# Patient Record
Sex: FEMALE | URBAN - METROPOLITAN AREA
[De-identification: names, ages, dates, MRNs, and addresses within clinical notes are randomized per-mention and may not be internally consistent; named-entity substitution may affect disease eponyms.]

---

## 2023-01-12 ENCOUNTER — INPATIENT (INPATIENT)
Facility: HOSPITAL | Age: 58
LOS: 4 days | Discharge: HOME | DRG: 392 | End: 2023-01-17
Attending: FAMILY MEDICINE | Admitting: FAMILY MEDICINE
Payer: MEDICAID

## 2023-01-12 VITALS
SYSTOLIC BLOOD PRESSURE: 103 MMHG | WEIGHT: 113.1 LBS | HEART RATE: 76 BPM | OXYGEN SATURATION: 100 % | RESPIRATION RATE: 15 BRPM | HEIGHT: 63 IN | DIASTOLIC BLOOD PRESSURE: 69 MMHG | TEMPERATURE: 98 F

## 2023-01-12 DIAGNOSIS — Z90.49 ACQUIRED ABSENCE OF OTHER SPECIFIED PARTS OF DIGESTIVE TRACT: Chronic | ICD-10-CM

## 2023-01-12 DIAGNOSIS — K57.32 DIVERTICULITIS OF LARGE INTESTINE WITHOUT PERFORATION OR ABSCESS WITHOUT BLEEDING: ICD-10-CM

## 2023-01-12 LAB
ALBUMIN SERPL ELPH-MCNC: 3.8 G/DL — SIGNIFICANT CHANGE UP (ref 3.3–5)
ALP SERPL-CCNC: 63 U/L — SIGNIFICANT CHANGE UP (ref 40–120)
ALT FLD-CCNC: 21 U/L — SIGNIFICANT CHANGE UP (ref 12–78)
ANION GAP SERPL CALC-SCNC: 5 MMOL/L — SIGNIFICANT CHANGE UP (ref 5–17)
APPEARANCE UR: CLEAR — SIGNIFICANT CHANGE UP
AST SERPL-CCNC: 20 U/L — SIGNIFICANT CHANGE UP (ref 15–37)
BASOPHILS # BLD AUTO: 0.05 K/UL — SIGNIFICANT CHANGE UP (ref 0–0.2)
BASOPHILS NFR BLD AUTO: 0.3 % — SIGNIFICANT CHANGE UP (ref 0–2)
BILIRUB SERPL-MCNC: 1.4 MG/DL — HIGH (ref 0.2–1.2)
BILIRUB UR-MCNC: NEGATIVE — SIGNIFICANT CHANGE UP
BUN SERPL-MCNC: 10 MG/DL — SIGNIFICANT CHANGE UP (ref 7–23)
CALCIUM SERPL-MCNC: 9 MG/DL — SIGNIFICANT CHANGE UP (ref 8.5–10.1)
CHLORIDE SERPL-SCNC: 105 MMOL/L — SIGNIFICANT CHANGE UP (ref 96–108)
CO2 SERPL-SCNC: 33 MMOL/L — HIGH (ref 22–31)
COLOR SPEC: SIGNIFICANT CHANGE UP
CREAT SERPL-MCNC: 0.71 MG/DL — SIGNIFICANT CHANGE UP (ref 0.5–1.3)
DIFF PNL FLD: ABNORMAL
EGFR: 100 ML/MIN/1.73M2 — SIGNIFICANT CHANGE UP
EOSINOPHIL # BLD AUTO: 0.02 K/UL — SIGNIFICANT CHANGE UP (ref 0–0.5)
EOSINOPHIL NFR BLD AUTO: 0.1 % — SIGNIFICANT CHANGE UP (ref 0–6)
GLUCOSE SERPL-MCNC: 133 MG/DL — HIGH (ref 70–99)
GLUCOSE UR QL: NEGATIVE — SIGNIFICANT CHANGE UP
HCG SERPL-ACNC: 3 MIU/ML — SIGNIFICANT CHANGE UP
HCT VFR BLD CALC: 39.9 % — SIGNIFICANT CHANGE UP (ref 34.5–45)
HGB BLD-MCNC: 12.6 G/DL — SIGNIFICANT CHANGE UP (ref 11.5–15.5)
IMM GRANULOCYTES NFR BLD AUTO: 0.4 % — SIGNIFICANT CHANGE UP (ref 0–0.9)
KETONES UR-MCNC: NEGATIVE — SIGNIFICANT CHANGE UP
LEUKOCYTE ESTERASE UR-ACNC: NEGATIVE — SIGNIFICANT CHANGE UP
LYMPHOCYTES # BLD AUTO: 1.54 K/UL — SIGNIFICANT CHANGE UP (ref 1–3.3)
LYMPHOCYTES # BLD AUTO: 9.9 % — LOW (ref 13–44)
MCHC RBC-ENTMCNC: 28.4 PG — SIGNIFICANT CHANGE UP (ref 27–34)
MCHC RBC-ENTMCNC: 31.6 GM/DL — LOW (ref 32–36)
MCV RBC AUTO: 89.9 FL — SIGNIFICANT CHANGE UP (ref 80–100)
MONOCYTES # BLD AUTO: 1.05 K/UL — HIGH (ref 0–0.9)
MONOCYTES NFR BLD AUTO: 6.8 % — SIGNIFICANT CHANGE UP (ref 2–14)
NEUTROPHILS # BLD AUTO: 12.82 K/UL — HIGH (ref 1.8–7.4)
NEUTROPHILS NFR BLD AUTO: 82.5 % — HIGH (ref 43–77)
NITRITE UR-MCNC: NEGATIVE — SIGNIFICANT CHANGE UP
NRBC # BLD: 0 /100 WBCS — SIGNIFICANT CHANGE UP (ref 0–0)
PH UR: 7 — SIGNIFICANT CHANGE UP (ref 5–8)
PLATELET # BLD AUTO: 131 K/UL — LOW (ref 150–400)
POTASSIUM SERPL-MCNC: 4.4 MMOL/L — SIGNIFICANT CHANGE UP (ref 3.5–5.3)
POTASSIUM SERPL-SCNC: 4.4 MMOL/L — SIGNIFICANT CHANGE UP (ref 3.5–5.3)
PROT SERPL-MCNC: 7.3 G/DL — SIGNIFICANT CHANGE UP (ref 6–8.3)
PROT UR-MCNC: NEGATIVE — SIGNIFICANT CHANGE UP
RBC # BLD: 4.44 M/UL — SIGNIFICANT CHANGE UP (ref 3.8–5.2)
RBC # FLD: 12.4 % — SIGNIFICANT CHANGE UP (ref 10.3–14.5)
SARS-COV-2 RNA SPEC QL NAA+PROBE: SIGNIFICANT CHANGE UP
SODIUM SERPL-SCNC: 143 MMOL/L — SIGNIFICANT CHANGE UP (ref 135–145)
SP GR SPEC: 1.01 — SIGNIFICANT CHANGE UP (ref 1.01–1.02)
UROBILINOGEN FLD QL: NEGATIVE — SIGNIFICANT CHANGE UP
WBC # BLD: 15.54 K/UL — HIGH (ref 3.8–10.5)
WBC # FLD AUTO: 15.54 K/UL — HIGH (ref 3.8–10.5)

## 2023-01-12 PROCEDURE — 71045 X-RAY EXAM CHEST 1 VIEW: CPT | Mod: 26

## 2023-01-12 PROCEDURE — 74177 CT ABD & PELVIS W/CONTRAST: CPT | Mod: 26,MA

## 2023-01-12 PROCEDURE — 99285 EMERGENCY DEPT VISIT HI MDM: CPT

## 2023-01-12 RX ORDER — ATORVASTATIN CALCIUM 80 MG/1
20 TABLET, FILM COATED ORAL AT BEDTIME
Refills: 0 | Status: DISCONTINUED | OUTPATIENT
Start: 2023-01-12 | End: 2023-01-17

## 2023-01-12 RX ORDER — ACETAMINOPHEN 500 MG
650 TABLET ORAL EVERY 6 HOURS
Refills: 0 | Status: DISCONTINUED | OUTPATIENT
Start: 2023-01-12 | End: 2023-01-13

## 2023-01-12 RX ORDER — HEPARIN SODIUM 5000 [USP'U]/ML
5000 INJECTION INTRAVENOUS; SUBCUTANEOUS EVERY 12 HOURS
Refills: 0 | Status: DISCONTINUED | OUTPATIENT
Start: 2023-01-12 | End: 2023-01-13

## 2023-01-12 RX ORDER — SODIUM CHLORIDE 9 MG/ML
1000 INJECTION INTRAMUSCULAR; INTRAVENOUS; SUBCUTANEOUS ONCE
Refills: 0 | Status: COMPLETED | OUTPATIENT
Start: 2023-01-12 | End: 2023-01-12

## 2023-01-12 RX ORDER — SODIUM CHLORIDE 9 MG/ML
1000 INJECTION, SOLUTION INTRAVENOUS
Refills: 0 | Status: DISCONTINUED | OUTPATIENT
Start: 2023-01-12 | End: 2023-01-13

## 2023-01-12 RX ORDER — PIPERACILLIN AND TAZOBACTAM 4; .5 G/20ML; G/20ML
3.38 INJECTION, POWDER, LYOPHILIZED, FOR SOLUTION INTRAVENOUS EVERY 8 HOURS
Refills: 0 | Status: DISCONTINUED | OUTPATIENT
Start: 2023-01-12 | End: 2023-01-17

## 2023-01-12 RX ORDER — LACTOBACILLUS ACIDOPHILUS 100MM CELL
1 CAPSULE ORAL DAILY
Refills: 0 | Status: DISCONTINUED | OUTPATIENT
Start: 2023-01-12 | End: 2023-01-17

## 2023-01-12 RX ORDER — PIPERACILLIN AND TAZOBACTAM 4; .5 G/20ML; G/20ML
3.38 INJECTION, POWDER, LYOPHILIZED, FOR SOLUTION INTRAVENOUS ONCE
Refills: 0 | Status: COMPLETED | OUTPATIENT
Start: 2023-01-12 | End: 2023-01-12

## 2023-01-12 RX ADMIN — PIPERACILLIN AND TAZOBACTAM 3.38 GRAM(S): 4; .5 INJECTION, POWDER, LYOPHILIZED, FOR SOLUTION INTRAVENOUS at 15:25

## 2023-01-12 RX ADMIN — PIPERACILLIN AND TAZOBACTAM 200 GRAM(S): 4; .5 INJECTION, POWDER, LYOPHILIZED, FOR SOLUTION INTRAVENOUS at 14:55

## 2023-01-12 RX ADMIN — ATORVASTATIN CALCIUM 20 MILLIGRAM(S): 80 TABLET, FILM COATED ORAL at 22:54

## 2023-01-12 RX ADMIN — SODIUM CHLORIDE 1000 MILLILITER(S): 9 INJECTION INTRAMUSCULAR; INTRAVENOUS; SUBCUTANEOUS at 11:30

## 2023-01-12 RX ADMIN — SODIUM CHLORIDE 1000 MILLILITER(S): 9 INJECTION INTRAMUSCULAR; INTRAVENOUS; SUBCUTANEOUS at 12:30

## 2023-01-12 RX ADMIN — SODIUM CHLORIDE 125 MILLILITER(S): 9 INJECTION, SOLUTION INTRAVENOUS at 18:48

## 2023-01-12 RX ADMIN — PIPERACILLIN AND TAZOBACTAM 25 GRAM(S): 4; .5 INJECTION, POWDER, LYOPHILIZED, FOR SOLUTION INTRAVENOUS at 22:54

## 2023-01-12 NOTE — ED ADULT NURSE NOTE - OBJECTIVE STATEMENT
Pt accompanied by daughter, translated by daughter with pt's consent, Pt with history of partial colon resection (sigmoid colon 7/13/2022) sent to ED by urgent care for abdominal pain and fever since Tuesday (x2 days).  Pain is right lower quadrant, constant, sharp, nonradiating.  Associated with nausea, no vomiting.  Patient also constipated however passing gas.  Denies chest pain, shortness of breath, urinary symptoms, flank pain, cough, congestion, headache, dizziness, syncope, upper or lower extremity weakness or paresthesias. family st bedside, safety maintained, call bell within reach. Nursing care ongoing.

## 2023-01-12 NOTE — CONSULT NOTE ADULT - SUBJECTIVE AND OBJECTIVE BOX
HPI:  Ms. Skinner is a 56 yo F with a hist    PAST MEDICAL & SURGICAL HISTORY:  Colon cancer      HLD (hyperlipidemia)      H/O partial resection of colon          REVIEW OF SYSTEMS      General:	    Skin/Breast:  	  Ophthalmologic:  	  ENMT:	    Respiratory and Thorax:  	  Cardiovascular:	    Gastrointestinal:	    Genitourinary:	    Musculoskeletal:	    Neurological:	    Psychiatric:	    Hematology/Lymphatics:	    Endocrine:	    Allergic/Immunologic:	    MEDICATIONS  (STANDING):    MEDICATIONS  (PRN):      Allergies    No Known Allergies    Intolerances        SOCIAL HISTORY:    FAMILY HISTORY:      Vital Signs Last 24 Hrs  T(C): 36.6 (2023 11:06), Max: 36.6 (2023 11:06)  T(F): 97.8 (2023 11:06), Max: 97.8 (2023 11:06)  HR: 76 (2023 11:06) (76 - 76)  BP: 103/69 (2023 11:06) (103/69 - 103/69)  BP(mean): --  RR: 15 (2023 11:06) (15 - 15)  SpO2: 100% (2023 11:06) (100% - 100%)    Parameters below as of 2023 11:06  Patient On (Oxygen Delivery Method): room air        PHYSICAL EXAM:      Constitutional:    Eyes:    ENMT:    Neck:    Breasts:    Back:    Respiratory:    Cardiovascular:    Gastrointestinal:    Genitourinary:    Rectal:    Extremities:    Vascular:    Neurological:    Skin:    Lymph Nodes:    Musculoskeletal:    Psychiatric:        LABS:                        12.6   15.54 )-----------( 131      ( 2023 11:30 )             39.9     12    143  |  105  |  10  ----------------------------<  133<H>  4.4   |  33<H>  |  0.71    Ca    9.0      2023 11:30    TPro  7.3  /  Alb  3.8  /  TBili  1.4<H>  /  DBili  x   /  AST  20  /  ALT  21  /  AlkPhos  63  01-12      Urinalysis Basic - ( 2023 13:20 )    Color: Pale Yellow / Appearance: Clear / S.010 / pH: x  Gluc: x / Ketone: Negative  / Bili: Negative / Urobili: Negative   Blood: x / Protein: Negative / Nitrite: Negative   Leuk Esterase: Negative / RBC: 6-10 /HPF / WBC 0-2   Sq Epi: x / Non Sq Epi: Occasional / Bacteria: Occasional        RADIOLOGY & ADDITIONAL STUDIES: HPI:  Ms. Skinner is a 56 yo F with a history of colon cancer found last 2022 on colonoscopy, for which she had a sigmoid resection done in 2022. She presents with a two day history of RLQ abdominal pain with associated constipation, passing flatus, but denies any nausea or vomiting. She reports a Tmax of 101 at home. She describes the pain as a sharp, non-radiating, constant pain. She denies any chest pian, SOB, dysuria, weakness or fatigue. On exam she is tender to palpation in the right lower quadrant and periumbilically but still soft and non-distended. History obtained using daughter as  over the phone.     Labs significant for an elevated WBC at 15.5, CT findings include diverticulitis r/o abscess.     PAST MEDICAL & SURGICAL HISTORY:  Colon cancer    HLD (hyperlipidemia)    H/O partial resection of colon        REVIEW OF SYSTEMS  Head: denies headaches, dizziness & lightheadedness  Eyes: denies changes in vision, eye pain, double vision & eye discharge  Ears: denies changes in hearing & ear discharge  Nose: denies rhinorrhea  Mouth: denies bleeding gums & sore tongue & sore throat  Neck: denies swollen lymph nodes   Respiratory: denies SOB, cough, sputum production, wheezing  Cardiac: denies CP & irregular heart beat  Abdominal: as noted in HPI  : denies dysuria, frequent urination, hematuria  Musculoskeletal: denies joint pain & muscle pain  Neuro: denies involuntary muscle movements  Psych: no depression, no anxiety     MEDICATIONS  (STANDING):    MEDICATIONS  (PRN):      Allergies    No Known Allergies      Vital Signs Last 24 Hrs  T(C): 36.6 (2023 11:06), Max: 36.6 (2023 11:06)  T(F): 97.8 (2023 11:06), Max: 97.8 (2023 11:06)  HR: 76 (2023 11:06) (76 - 76)  BP: 103/69 (2023 11:06) (103/69 - 103/69)  BP(mean): --  RR: 15 (2023 11:06) (15 - 15)  SpO2: 100% (2023 11:06) (100% - 100%)    Parameters below as of 2023 11:06  Patient On (Oxygen Delivery Method): room air        PHYSICAL EXAM:  Constitutional: AAOx3, no acute distress  HEENT: NCAT, airway patent  Cardiovascular: RRR, pulses present bilaterally  Respiratory: nonlabored breathing  Gastrointestinal: abdomen soft, tender to palpation in the RLQ and periumbilical, non distended, no rebound or guarding  Neuro: no focal deficits     LABS:                        12.6   15.54 )-----------( 131      ( 2023 11:30 )             39.9         143  |  105  |  10  ----------------------------<  133<H>  4.4   |  33<H>  |  0.71    Ca    9.0      2023 11:30    TPro  7.3  /  Alb  3.8  /  TBili  1.4<H>  /  DBili  x   /  AST  20  /  ALT  21  /  AlkPhos  63        Urinalysis Basic - ( 2023 13:20 )    Color: Pale Yellow / Appearance: Clear / S.010 / pH: x  Gluc: x / Ketone: Negative  / Bili: Negative / Urobili: Negative   Blood: x / Protein: Negative / Nitrite: Negative   Leuk Esterase: Negative / RBC: 6-10 /HPF / WBC 0-2   Sq Epi: x / Non Sq Epi: Occasional / Bacteria: Occasional        RADIOLOGY & ADDITIONAL STUDIES:  < from: CT Abdomen and Pelvis w/ IV Cont (23 @ 13:17) >  ACC: [id number] EXAM:  CT ABDOMEN AND PELVIS IC                          PROCEDURE DATE:  2023          INTERPRETATION:  CLINICAL INFORMATION: Right lower quadrant abdominal   pain status post partial colonic resection for colon carcinoma    COMPARISON: No prior examinations available for comparison.    CONTRAST/COMPLICATIONS:  IV Contrast: Omnipaque 350  90 cc administered   10 cc discarded  Oral Contrast: NONE  Complications: None reported at time of study completion    PROCEDURE:  CT of the Abdomen and Pelvis was performed.  Sagittal and coronal reformats were performed.    FINDINGS:  LOWER CHEST: No significant pleural or parenchymal abnormality.    LIVER: Within normal limits.  BILE DUCTS: Normal caliber.  GALLBLADDER: Within normal limits.  SPLEEN: Within normal limits.  PANCREAS: Within normal limits.  ADRENALS: Within normal limits.  KIDNEYS/URETERS: No hydronephrosis. No renal calculi. Nonspecific   subcentimeter hypodense regions are identified within the renal   parenchyma.    BLADDER: Minimally distended.  REPRODUCTIVE ORGANS: Uterus not visualized. Correlate with surgical   history.    BOWEL: Surgical anastomosis identified in the region of the distal   sigmoid colon. Fecal material scattered throughout the colon. There is   segmental wall thickening of the distal ascending colon with stranding of   the surrounding paracolic fat and thickening of the adjacent   retroperitoneal fascial plane. Findings most suggestive for segmental   colonic diverticulitis. Intramural abscess within the medial wall of the   distal ascending colon cannot be excluded. Appendix is normal.  PERITONEUM: A small volume of free pelvic fluid identified. No free   intraperitoneal air.  VESSELS: Within normal limits.  RETROPERITONEUM/LYMPH NODES: No lymphadenopathy.  ABDOMINAL WALL: Within normal limits.  BONES: Nonspecific region of sclerosis right ischial region. Additional   sclerotic foci are identified within the bilateral iliac bones. No acute   osseous fracture.    IMPRESSION:  1. There is segmental wall thickening of the distal ascending colon with   stranding of the surrounding paracolic fat and thickening of the adjacent   retroperitoneal fascial plane. Findings most suggestive for segmental   colonic diverticulitis. Intramural abscess within the medial wall of the   distal ascending colon cannot be excluded.  2. Nonspecific region of sclerosis right ischial region. Additional   sclerotic foci are identified within the bilateral iliac bones. Correlate   with prior examinations. Consider bone scintigraphy for further   assessment in light of known history of colon carcinoma.          --- End of Report ---            MARY CASTRO MD; Attending Radiologist  This document has been electronically signed. 2023  2:07PM    < end of copied text >   HPI:  Ms. Skinner is a 56 yo F with a history of colon cancer found last 2022 on colonoscopy, for which she had a sigmoid resection done in 2022 with Dr. Pedro Bautista, [facility]. She presents with a two day history of RLQ abdominal pain with associated constipation, passing flatus, but denies any nausea or vomiting. She reports a Tmax of 101 at home. She describes the pain as a sharp, non-radiating, constant pain. She denies any chest pian, SOB, dysuria, weakness or fatigue. On exam she is tender to palpation in the right lower quadrant and periumbilically but still soft and non-distended. History obtained using daughter as  over the phone.     Labs significant for an elevated WBC at 15.5, CT findings include diverticulitis r/o abscess.     PAST MEDICAL & SURGICAL HISTORY:  Colon cancer    HLD (hyperlipidemia)    H/O partial resection of colon        REVIEW OF SYSTEMS  Head: denies headaches, dizziness & lightheadedness  Eyes: denies changes in vision, eye pain, double vision & eye discharge  Ears: denies changes in hearing & ear discharge  Nose: denies rhinorrhea  Mouth: denies bleeding gums & sore tongue & sore throat  Neck: denies swollen lymph nodes   Respiratory: denies SOB, cough, sputum production, wheezing  Cardiac: denies CP & irregular heart beat  Abdominal: as noted in HPI  : denies dysuria, frequent urination, hematuria  Musculoskeletal: denies joint pain & muscle pain  Neuro: denies involuntary muscle movements  Psych: no depression, no anxiety     MEDICATIONS  (STANDING):    MEDICATIONS  (PRN):      Allergies    No Known Allergies      Vital Signs Last 24 Hrs  T(C): 36.6 (2023 11:06), Max: 36.6 (2023 11:06)  T(F): 97.8 (2023 11:06), Max: 97.8 (2023 11:06)  HR: 76 (2023 11:06) (76 - 76)  BP: 103/69 (2023 11:06) (103/69 - 103/69)  BP(mean): --  RR: 15 (2023 11:06) (15 - 15)  SpO2: 100% (2023 11:06) (100% - 100%)    Parameters below as of 2023 11:06  Patient On (Oxygen Delivery Method): room air        PHYSICAL EXAM:  Constitutional: AAOx3, no acute distress  HEENT: NCAT, airway patent  Cardiovascular: RRR, pulses present bilaterally  Respiratory: nonlabored breathing  Gastrointestinal: abdomen soft, tender to palpation in the RLQ and periumbilical, non distended, no rebound or guarding  Neuro: no focal deficits     LABS:                        12.6   15.54 )-----------( 131      ( 2023 11:30 )             39.9         143  |  105  |  10  ----------------------------<  133<H>  4.4   |  33<H>  |  0.71    Ca    9.0      2023 11:30    TPro  7.3  /  Alb  3.8  /  TBili  1.4<H>  /  DBili  x   /  AST  20  /  ALT  21  /  AlkPhos  63        Urinalysis Basic - ( 2023 13:20 )    Color: Pale Yellow / Appearance: Clear / S.010 / pH: x  Gluc: x / Ketone: Negative  / Bili: Negative / Urobili: Negative   Blood: x / Protein: Negative / Nitrite: Negative   Leuk Esterase: Negative / RBC: 6-10 /HPF / WBC 0-2   Sq Epi: x / Non Sq Epi: Occasional / Bacteria: Occasional        RADIOLOGY & ADDITIONAL STUDIES:  < from: CT Abdomen and Pelvis w/ IV Cont (23 @ 13:17) >  ACC: [id number] EXAM:  CT ABDOMEN AND PELVIS IC                          PROCEDURE DATE:  2023          INTERPRETATION:  CLINICAL INFORMATION: Right lower quadrant abdominal   pain status post partial colonic resection for colon carcinoma    COMPARISON: No prior examinations available for comparison.    CONTRAST/COMPLICATIONS:  IV Contrast: Omnipaque 350  90 cc administered   10 cc discarded  Oral Contrast: NONE  Complications: None reported at time of study completion    PROCEDURE:  CT of the Abdomen and Pelvis was performed.  Sagittal and coronal reformats were performed.    FINDINGS:  LOWER CHEST: No significant pleural or parenchymal abnormality.    LIVER: Within normal limits.  BILE DUCTS: Normal caliber.  GALLBLADDER: Within normal limits.  SPLEEN: Within normal limits.  PANCREAS: Within normal limits.  ADRENALS: Within normal limits.  KIDNEYS/URETERS: No hydronephrosis. No renal calculi. Nonspecific   subcentimeter hypodense regions are identified within the renal   parenchyma.    BLADDER: Minimally distended.  REPRODUCTIVE ORGANS: Uterus not visualized. Correlate with surgical   history.    BOWEL: Surgical anastomosis identified in the region of the distal   sigmoid colon. Fecal material scattered throughout the colon. There is   segmental wall thickening of the distal ascending colon with stranding of   the surrounding paracolic fat and thickening of the adjacent   retroperitoneal fascial plane. Findings most suggestive for segmental   colonic diverticulitis. Intramural abscess within the medial wall of the   distal ascending colon cannot be excluded. Appendix is normal.  PERITONEUM: A small volume of free pelvic fluid identified. No free   intraperitoneal air.  VESSELS: Within normal limits.  RETROPERITONEUM/LYMPH NODES: No lymphadenopathy.  ABDOMINAL WALL: Within normal limits.  BONES: Nonspecific region of sclerosis right ischial region. Additional   sclerotic foci are identified within the bilateral iliac bones. No acute   osseous fracture.    IMPRESSION:  1. There is segmental wall thickening of the distal ascending colon with   stranding of the surrounding paracolic fat and thickening of the adjacent   retroperitoneal fascial plane. Findings most suggestive for segmental   colonic diverticulitis. Intramural abscess within the medial wall of the   distal ascending colon cannot be excluded.  2. Nonspecific region of sclerosis right ischial region. Additional   sclerotic foci are identified within the bilateral iliac bones. Correlate   with prior examinations. Consider bone scintigraphy for further   assessment in light of known history of colon carcinoma.          --- End of Report ---            MARY CASTRO MD; Attending Radiologist  This document has been electronically signed. 2023  2:07PM    < end of copied text >

## 2023-01-12 NOTE — ED ADULT NURSE NOTE - NSIMPLEMENTINTERV_GEN_ALL_ED
Implemented All Universal Safety Interventions:  [place] to call system. Call bell, personal items and telephone within reach. Instruct patient to call for assistance. Room bathroom lighting operational. Non-slip footwear when patient is off stretcher. Physically safe environment: no spills, clutter or unnecessary equipment. Stretcher in lowest position, wheels locked, appropriate side rails in place.

## 2023-01-12 NOTE — PATIENT PROFILE ADULT - FALL HARM RISK - UNIVERSAL INTERVENTIONS
Bed in lowest position, wheels locked, appropriate side rails in place/Call bell, personal items and telephone in reach/Instruct patient to call for assistance before getting out of bed or chair/Non-slip footwear when patient is out of bed/[place] to call system/Physically safe environment - no spills, clutter or unnecessary equipment/Purposeful Proactive Rounding/Room/bathroom lighting operational, light cord in reach

## 2023-01-12 NOTE — CONSULT NOTE ADULT - ASSESSMENT
58 yo Female w pmh colon CA s/p sigmoid resection, presenting with two day hx RLQ pain and fever, found to have diverticulitis with ?abscess and elevated WBC    Plan:  Admit to medicine, surgery will follow  IV Zosyn   NPO/IVF  Serial abdominal exams  Trend labs  Trend vitals  DVT ppx  GI consult  Heme/Onc consult- work up sclerosis of right ischial region in setting of colon carcinoma   Discussed with Dr. Segura

## 2023-01-12 NOTE — ED ADULT NURSE NOTE - STOOL PATTERN
PRELIMINARY LEXISCAN NUCLEAR EKG NOTE  Pt tolerated Lexiscan well; denied cardiac symptoms; no ekg changes or arrythmias; nuc pictures pending. normal patient pattern

## 2023-01-12 NOTE — ED PROVIDER NOTE - OBJECTIVE STATEMENT
57-year-old female with history of partial colon resection (sigmoid colon 7/13/2022) sent to ED by urgent care for abdominal pain and fever since Tuesday (x2 days).  Pain is right lower quadrant, constant, sharp, nonradiating.  Associated with nausea, no vomiting.  Patient also constipated however passing gas.  Denies chest pain, shortness of breath, urinary symptoms, flank pain, cough, congestion, headache, dizziness, syncope, upper or lower extremity weakness or paresthesias. 57-year-old female with history of partial colon resection (sigmoid colon 7/13/2022) sent to ED by urgent care for abdominal pain and fever since Tuesday (x2 days).  Pain is right lower quadrant, constant, sharp, nonradiating.  Associated with nausea, no vomiting.  Patient also constipated however passing gas.  Denies chest pain, shortness of breath, urinary symptoms, flank pain, cough, congestion, headache, dizziness, syncope, upper or lower extremity weakness or paresthesias.    pmd: Dr. Emile Olson

## 2023-01-12 NOTE — ED PROVIDER NOTE - CLINICAL SUMMARY MEDICAL DECISION MAKING FREE TEXT BOX
[place]: 57-year-old female with history of partial colon resection (sigmoid colon 7/13/2022) sent to ED by urgent care for abdominal pain and fever since Tuesday (x2 days).  Pain is right lower quadrant, constant, sharp, nonradiating.  Associated with nausea, no vomiting.  Patient also constipated however passing gas.  Denies chest pain, shortness of breath, urinary symptoms, flank pain, cough, congestion, headache, dizziness, syncope, upper or lower extremity weakness or paresthesias. CT shows diverticulitis with possible intramural abscess. Will need admission for IV abx and surgical evaluation.

## 2023-01-13 DIAGNOSIS — R10.9 UNSPECIFIED ABDOMINAL PAIN: ICD-10-CM

## 2023-01-13 DIAGNOSIS — Z29.9 ENCOUNTER FOR PROPHYLACTIC MEASURES, UNSPECIFIED: ICD-10-CM

## 2023-01-13 DIAGNOSIS — K57.32 DIVERTICULITIS OF LARGE INTESTINE WITHOUT PERFORATION OR ABSCESS WITHOUT BLEEDING: ICD-10-CM

## 2023-01-13 LAB
A1C WITH ESTIMATED AVERAGE GLUCOSE RESULT: 5.7 % — HIGH (ref 4–5.6)
ANION GAP SERPL CALC-SCNC: 8 MMOL/L — SIGNIFICANT CHANGE UP (ref 5–17)
BUN SERPL-MCNC: 7 MG/DL — SIGNIFICANT CHANGE UP (ref 7–23)
CALCIUM SERPL-MCNC: 8.3 MG/DL — LOW (ref 8.5–10.1)
CHLORIDE SERPL-SCNC: 110 MMOL/L — HIGH (ref 96–108)
CO2 SERPL-SCNC: 26 MMOL/L — SIGNIFICANT CHANGE UP (ref 22–31)
CREAT SERPL-MCNC: 0.51 MG/DL — SIGNIFICANT CHANGE UP (ref 0.5–1.3)
CULTURE RESULTS: SIGNIFICANT CHANGE UP
EGFR: 109 ML/MIN/1.73M2 — SIGNIFICANT CHANGE UP
ESTIMATED AVERAGE GLUCOSE: 117 MG/DL — HIGH (ref 68–114)
GLUCOSE SERPL-MCNC: 112 MG/DL — HIGH (ref 70–99)
HCT VFR BLD CALC: 35.4 % — SIGNIFICANT CHANGE UP (ref 34.5–45)
HGB BLD-MCNC: 11.4 G/DL — LOW (ref 11.5–15.5)
MAGNESIUM SERPL-MCNC: 2.1 MG/DL — SIGNIFICANT CHANGE UP (ref 1.6–2.6)
MCHC RBC-ENTMCNC: 28.7 PG — SIGNIFICANT CHANGE UP (ref 27–34)
MCHC RBC-ENTMCNC: 32.2 GM/DL — SIGNIFICANT CHANGE UP (ref 32–36)
MCV RBC AUTO: 89.2 FL — SIGNIFICANT CHANGE UP (ref 80–100)
NRBC # BLD: 0 /100 WBCS — SIGNIFICANT CHANGE UP (ref 0–0)
PHOSPHATE SERPL-MCNC: 2.1 MG/DL — LOW (ref 2.5–4.5)
PLATELET # BLD AUTO: 145 K/UL — LOW (ref 150–400)
POTASSIUM SERPL-MCNC: 3.4 MMOL/L — LOW (ref 3.5–5.3)
POTASSIUM SERPL-SCNC: 3.4 MMOL/L — LOW (ref 3.5–5.3)
RBC # BLD: 3.97 M/UL — SIGNIFICANT CHANGE UP (ref 3.8–5.2)
RBC # FLD: 12.5 % — SIGNIFICANT CHANGE UP (ref 10.3–14.5)
SODIUM SERPL-SCNC: 144 MMOL/L — SIGNIFICANT CHANGE UP (ref 135–145)
SPECIMEN SOURCE: SIGNIFICANT CHANGE UP
WBC # BLD: 13.45 K/UL — HIGH (ref 3.8–10.5)
WBC # FLD AUTO: 13.45 K/UL — HIGH (ref 3.8–10.5)

## 2023-01-13 PROCEDURE — 99222 1ST HOSP IP/OBS MODERATE 55: CPT

## 2023-01-13 RX ORDER — ACETAMINOPHEN 500 MG
650 TABLET ORAL EVERY 6 HOURS
Refills: 0 | Status: DISCONTINUED | OUTPATIENT
Start: 2023-01-13 | End: 2023-01-17

## 2023-01-13 RX ORDER — DEXTROSE MONOHYDRATE, SODIUM CHLORIDE, AND POTASSIUM CHLORIDE 50; .745; 4.5 G/1000ML; G/1000ML; G/1000ML
1000 INJECTION, SOLUTION INTRAVENOUS
Refills: 0 | Status: DISCONTINUED | OUTPATIENT
Start: 2023-01-13 | End: 2023-01-16

## 2023-01-13 RX ORDER — HEPARIN SODIUM 5000 [USP'U]/ML
5000 INJECTION INTRAVENOUS; SUBCUTANEOUS EVERY 8 HOURS
Refills: 0 | Status: DISCONTINUED | OUTPATIENT
Start: 2023-01-13 | End: 2023-01-17

## 2023-01-13 RX ORDER — POTASSIUM CHLORIDE 20 MEQ
40 PACKET (EA) ORAL ONCE
Refills: 0 | Status: COMPLETED | OUTPATIENT
Start: 2023-01-13 | End: 2023-01-13

## 2023-01-13 RX ADMIN — DEXTROSE MONOHYDRATE, SODIUM CHLORIDE, AND POTASSIUM CHLORIDE 100 MILLILITER(S): 50; .745; 4.5 INJECTION, SOLUTION INTRAVENOUS at 18:43

## 2023-01-13 RX ADMIN — PIPERACILLIN AND TAZOBACTAM 25 GRAM(S): 4; .5 INJECTION, POWDER, LYOPHILIZED, FOR SOLUTION INTRAVENOUS at 05:28

## 2023-01-13 RX ADMIN — Medication 1 TABLET(S): at 11:45

## 2023-01-13 RX ADMIN — PIPERACILLIN AND TAZOBACTAM 25 GRAM(S): 4; .5 INJECTION, POWDER, LYOPHILIZED, FOR SOLUTION INTRAVENOUS at 13:15

## 2023-01-13 RX ADMIN — Medication 40 MILLIEQUIVALENT(S): at 22:17

## 2023-01-13 RX ADMIN — HEPARIN SODIUM 5000 UNIT(S): 5000 INJECTION INTRAVENOUS; SUBCUTANEOUS at 21:43

## 2023-01-13 RX ADMIN — HEPARIN SODIUM 5000 UNIT(S): 5000 INJECTION INTRAVENOUS; SUBCUTANEOUS at 05:28

## 2023-01-13 RX ADMIN — SODIUM CHLORIDE 125 MILLILITER(S): 9 INJECTION, SOLUTION INTRAVENOUS at 05:29

## 2023-01-13 RX ADMIN — ATORVASTATIN CALCIUM 20 MILLIGRAM(S): 80 TABLET, FILM COATED ORAL at 21:43

## 2023-01-13 RX ADMIN — HEPARIN SODIUM 5000 UNIT(S): 5000 INJECTION INTRAVENOUS; SUBCUTANEOUS at 13:16

## 2023-01-13 RX ADMIN — Medication 650 MILLIGRAM(S): at 13:15

## 2023-01-13 RX ADMIN — Medication 650 MILLIGRAM(S): at 14:15

## 2023-01-13 RX ADMIN — PIPERACILLIN AND TAZOBACTAM 25 GRAM(S): 4; .5 INJECTION, POWDER, LYOPHILIZED, FOR SOLUTION INTRAVENOUS at 21:44

## 2023-01-13 NOTE — CONSULT NOTE ADULT - SUBJECTIVE AND OBJECTIVE BOX
[facility]  INFECTIOUS DISEASES   [address] [place] [address]  Tel: [phone]     Fax: [phone]  ========================================================  MD Megha Joiner Kaushal, MD Cho, Michelle, MD Sunjit, Jaspal, MD  ========================================================    MRN-[id number]  CEDRICK HORTON     CC: Abdominal pain     HPI:  Patient offered  but she prefers to use her daughter on the phone, whom interpreted for both of us.   56 yo woman with PMH of sigmoid colon cancer s/p resection in 2022 at [facility], was admitted with abdominal pain associated with constipation for 2 days.   The pain is in RLQ, no nausea or vomiting or diarrhea.   She was having flatus and small amount of stool in last few days. No fever or chills, Her TMax today 99.9.     PAST MEDICAL & SURGICAL HISTORY:  Colon cancer  HLD (hyperlipidemia)  H/O partial resection of colon    Social Hx: no smoking, ETOH or drugs     FAMILY HISTORY: Father with prostate cancer and mother Leukemia     Allergies  No Known Allergies    Antibiotics:  MEDICATIONS  (STANDING):  atorvastatin 20 milliGRAM(s) Oral at bedtime  dextrose 5% + sodium chloride 0.9%. 1000 milliLiter(s) (125 mL/Hr) IV Continuous <Continuous>  heparin   Injectable 5000 Unit(s) SubCutaneous every 8 hours  lactobacillus acidophilus 1 Tablet(s) Oral daily  multivitamin 1 Tablet(s) Oral daily  piperacillin/tazobactam IVPB.. 3.375 Gram(s) IV Intermittent every 8 hours    REVIEW OF SYSTEMS:  CONSTITUTIONAL:  No Fever or chills  HEENT:  No diplopia or blurred vision.  No sore throat or runny nose.  CARDIOVASCULAR:  No chest pain or SOB.  RESPIRATORY:  No cough, shortness of breath, PND or orthopnea.  GASTROINTESTINAL:  No nausea, vomiting or diarrhea. + abdominal pain   GENITOURINARY:  No dysuria, frequency or urgency. No Blood in urine  MUSCULOSKELETAL:  no joint aches, no muscle pain  SKIN:  No change in skin, hair or nails.  NEUROLOGIC:  No paresthesias or weakness.  PSYCHIATRIC:  No disorder of thought or mood.  ENDOCRINE:  No heat or cold intolerance, polyuria or polydipsia.  HEMATOLOGICAL:  No easy bruising or bleeding.     Physical Exam:  Vital Signs Last 24 Hrs  T(C): 37.3 (2023 05:07), Max: 37.7 (2023 20:15)  T(F): 99.1 (2023 05:07), Max: 99.9 (2023 20:15)  HR: 76 (2023 05:07) (76 - 80)  BP: 105/64 (2023 05:07) (102/64 - 115/66)  BP(mean): --  RR: 17 (2023 05:07) (15 - 20)  SpO2: 95% (2023 05:07) (95% - 100%)  Parameters below as of 2023 05:07  Patient On (Oxygen Delivery Method): room air  Height (cm): 160 ( @ 11:06)  Weight (kg): 51.3 ( @ 11:06)  BMI (kg/m2): 20 ( @ 11:06)  BSA (m2): 1.52 ( @ 11:06)  GEN: NAD  HEENT: normocephalic and atraumatic. EOMI. PERRL.    NECK: Supple.  No lymphadenopathy   LUNGS: Clear to auscultation.  HEART: Regular rate and rhythm without murmur.  ABDOMEN: Soft, nondistended.  Positive bowel sounds.   RLQ tenderness +     : No CVA tenderness  EXTREMITIES: Without edema.  NEUROLOGIC: grossly intact.  PSYCHIATRIC: Appropriate affect .  SKIN: No rash     Labs:      143  |  105  |  10  ----------------------------<  133<H>  4.4   |  33<H>  |  0.71    Ca    9.0      2023 11:30    TPro  7.3  /  Alb  3.8  /  TBili  1.4<H>  /  DBili  x   /  AST  20  /  ALT  21  /  AlkPhos  63                          11.4   13.45 )-----------( 145      ( 2023 07:30 )             35.4     Urinalysis Basic - ( 2023 13:20 )    Color: Pale Yellow / Appearance: Clear / S.010 / pH: x  Gluc: x / Ketone: Negative  / Bili: Negative / Urobili: Negative   Blood: x / Protein: Negative / Nitrite: Negative   Leuk Esterase: Negative / RBC: 6-10 /HPF / WBC 0-2   Sq Epi: x / Non Sq Epi: Occasional / Bacteria: Occasional    LIVER FUNCTIONS - ( 2023 11:30 )  Alb: 3.8 g/dL / Pro: 7.3 g/dL / ALK PHOS: 63 U/L / ALT: 21 U/L / AST: 20 U/L / GGT: x           COVID-19 PCR: NotDetec (23 @ 11:30)    All imaging and other data have been reviewed.  < from: CT Abdomen and Pelvis w/ IV Cont (23 @ 13:17) >  IMPRESSION:  1. There is segmental wall thickening of the distal ascending colon with   stranding of the surrounding paracolic fat and thickening of the adjacent   retroperitoneal fascial plane. Findings most suggestive for segmental   colonic diverticulitis. Intramural abscess within the medial wall of the   distal ascending colon cannot be excluded.  2. Nonspecific region of sclerosis right ischial region. Additional   sclerotic foci are identified within the bilateral iliac bones. Correlate   with prior examinations. Consider bone scintigraphy for further   assessment in light of known history of colon carcinoma.    Assessment and Plan:   56 yo woman with PMH of sigmoid colon cancer s/p resection in 2022 at [facility], was admitted with abdominal pain associated with constipation for 2 days.   CT showed possible distal ascending colon diverticulitis but with the recent surgery and cancer diagnosis, it could be complications and recurrence of malignancy as well.   For now will need to cover abdominal yobani until more work up is done.     Recommendations:  - Blood culture x 2   - Agree with Zosyn 3.375gm q8   - Surgery and GI consults   - Will monitor TMax and WBC     Thank you for courtesy of this consult.     Will follow.  Discussed with the primary team.     Mya Kat MD  Division of Infectious Diseases   Please call ID service at [phone] with any question.      55 minutes spent on total encounter assessing patient, examination, chart review, counseling and coordinating care by the attending physician/nurse/care manager.

## 2023-01-13 NOTE — CONSULT NOTE ADULT - ASSESSMENT
[ASSESSMENT and  PLAN]      RECOMMENDATIONS      DVT Prophyalxis    Thank you for consulting us.      Discussed with Dr xxxxxxx.    > xxxxxx minutes spent in direct patient care, examining and counseling patient,  reviewing  the notes, lab data/ imaging , discussion with multidisciplinary team.      [ASSESSMENT and  PLAN]  Hx of sigmoid colon cancer  s/p LAR  Stage I  no adjuvant chemo     admitted with abd pain, leukocytosis, possible diverticulitis.    Incidental noted sclerotic ischial lesions of unclear etiology  Bone mets possible      RECOMMENDATIONS    IV abx per infectious diseases  on Zosyn    Consideration for MRI or bone scan for bone mets workup.   Can be done inpt or outpt.   Need not hold pt in hospital for workup.   Pt appears reliable for followup;.     Consideration for imaging to further eval abscess / infection.   WBC is however improving    DVT Prophylaxis  SQ heparin    Thank you for consulting us.        > 71  minutes spent in direct patient care, examining and counseling patient,  reviewing  the notes, lab data/ imaging , discussion with multidisciplinary team.

## 2023-01-13 NOTE — H&P ADULT - NSHPPHYSICALEXAM_GEN_ALL_CORE
· CONSTITUTIONAL: Well appearing, awake, alert, oriented to person, place, time/situation and in no apparent distress.  · EYES: Clear bilaterally, pupils equal, round and reactive to light.  · CARDIAC: Normal rate, regular rhythm.  · RESPIRATORY: Breath sounds clear and equal bilaterally.  · GASTROINTESTINAL: - - -  · Abdominal Exam: soft, nondistended  · Bowel Sounds: present x 4 quadrants  · MUSCULOSKELETAL: moving all extremities. radial pulses equal and intact bilaterally.  · NEUROLOGICAL: Alert and oriented, no focal deficits, no motor or sensory deficits. neurovascular intact.  · SKIN: Skin intact. No evidence of rash.

## 2023-01-13 NOTE — H&P ADULT - NSHPREVIEWOFSYSTEMS_GEN_ALL_CORE
· Constitutional [+]: FEVER  · GASTROINTESTINAL: - - -  · Gastrointestinal [+]: ABDOMINAL PAIN, NAUSEA, + constipation  · ROS STATEMENT: all other ROS negative except as per HPI

## 2023-01-13 NOTE — PROGRESS NOTE ADULT - SUBJECTIVE AND OBJECTIVE BOX
SUBJECTIVE:  Patient seen and examined at bedside.  No overnight events.  Patient reports continued RLQ pain, however slightly improved from yesterday.  Admits to passing flatus. No BM. No fevers. She denies nausea/vomiting.  Patient is Mandarin-speaking. [place]  services utilized; ID#[id number]    VITALS  Vital Signs Last 24 Hrs  T(C): 37.3 (13 Jan 2023 05:07), Max: 37.7 (12 Jan 2023 20:15)  T(F): 99.1 (13 Jan 2023 05:07), Max: 99.9 (12 Jan 2023 20:15)  HR: 76 (13 Jan 2023 05:07) (76 - 80)  BP: 105/64 (13 Jan 2023 05:07) (102/64 - 115/66)  RR: 17 (13 Jan 2023 05:07) (15 - 20)  SpO2: 95% (13 Jan 2023 05:07) (95% - 100%)    Parameters below as of 13 Jan 2023 05:07  Patient On (Oxygen Delivery Method): room air    PHYSICAL EXAM  GENERAL:  Well-developed well-nourished female lying comfortably in bed in [facility].  HEENT:  NC/AT. Sclera white. Mucous membranes moist.  CARDIO:  Regular rate and rhythm.  RESPIRATORY:  Nonlabored breathing, no accessory muscle use.  ABDOMEN:  Soft, nondistended, Moderately tender in RLQ.  No rebound tenderness or guarding.  SKIN:  No jaundice, pallor, or cyanosis  NEURO:  Alert; answers questions appropriately.    INTAKE & OUTPUT  I&O's Summary    12 Jan 2023 07:01  -  13 Jan 2023 07:00  --------------------------------------------------------  IN: 1700 mL / OUT: 0 mL / NET: 1700 mL    I&O's Detail    12 Jan 2023 07:01  -  13 Jan 2023 07:00  --------------------------------------------------------  IN:    dextrose 5% + sodium chloride 0.9%: 1500 mL    IV PiggyBack: 200 mL  Total IN: 1700 mL    OUT:  Total OUT: 0 mL    Total NET: 1700 mL    MEDICATIONS  MEDICATIONS  (STANDING):  atorvastatin 20 milliGRAM(s) Oral at bedtime  dextrose 5% + sodium chloride 0.9%. 1000 milliLiter(s) (125 mL/Hr) IV Continuous <Continuous>  heparin   Injectable 5000 Unit(s) SubCutaneous every 12 hours  lactobacillus acidophilus 1 Tablet(s) Oral daily  multivitamin 1 Tablet(s) Oral daily  piperacillin/tazobactam IVPB.. 3.375 Gram(s) IV Intermittent every 8 hours    MEDICATIONS  (PRN):  acetaminophen     Tablet .. 650 milliGRAM(s) Oral every 6 hours PRN Temp greater or equal to 38C (100.4F)    LABS:                        12.6   15.54 )-----------( 131      ( 12 Jan 2023 11:30 )             39.9     01-12    143  |  105  |  10  ----------------------------<  133<H>  4.4   |  33<H>  |  0.71    Ca    9.0      12 Jan 2023 11:30    TPro  7.3  /  Alb  3.8  /  TBili  1.4<H>  /  DBili  x   /  AST  20  /  ALT  21  /  AlkPhos  63  01-12

## 2023-01-13 NOTE — H&P ADULT - NSHPLABSRESULTS_GEN_ALL_CORE
144  |  110<H>  |  7   ----------------------------<  112<H>  3.4<L>   |  26  |  0.51    Ca    8.3<L>      2023 09:05  Phos  2.1       Mg     2.1         TPro  7.3  /  Alb  3.8  /  TBili  1.4<H>  /  DBili  x   /  AST  20  /  ALT  21  /  AlkPhos  63                              11.4   13.45 )-----------( 145      ( 2023 07:30 )             35.4             LIVER FUNCTIONS - ( 2023 11:30 )  Alb: 3.8 g/dL / Pro: 7.3 g/dL / ALK PHOS: 63 U/L / ALT: 21 U/L / AST: 20 U/L / GGT: x                 Urinalysis Basic - ( 2023 13:20 )    Color: Pale Yellow / Appearance: Clear / S.010 / pH: x  Gluc: x / Ketone: Negative  / Bili: Negative / Urobili: Negative   Blood: x / Protein: Negative / Nitrite: Negative   Leuk Esterase: Negative / RBC: 6-10 /HPF / WBC 0-2   Sq Epi: x / Non Sq Epi: Occasional / Bacteria: Occasional

## 2023-01-13 NOTE — GOALS OF CARE CONVERSATION - ADVANCED CARE PLANNING - CONVERSATION DETAILS
Writer met with pt and  and dtr at bedside. Reviewed patient's medical and social history as well as events leading to patient's hospitalization. Writer discussed patient's current diagnosis (r/o appendicitis, h/o colon cancer), medical condition and management. Inquired about patient's wishes regarding extent of medical care to be provided including escalation of medical care into the ICU and use of vasopressor support. In addition, the writer inquired about thoughts regarding cardiopulmonary resuscitation, artificial nutrition and hydration including use of feeding tubes and IVF, antibiotics, and further investigative studies such as blood draws and radiology.Pt and dtr  showed  insight into medical condition. All questions answered. Writer recommended completion of MOLST. Pt named her  as proxy. Psychosocial support provided.

## 2023-01-13 NOTE — H&P ADULT - HISTORY OF PRESENT ILLNESS
57-year-old female with history of partial colon resection (sigmoid colon 7/13/2022) sent to ED by urgent care for abdominal pain and fever since Tuesday (x2 days).  Pain is right lower quadrant, constant, sharp, nonradiating.  Associated with nausea, no vomiting.  Patient also constipated however passing gas.  Denies chest pain, shortness of breath, urinary symptoms, flank pain, cough, congestion, headache, dizziness, syncope, upper or lower extremity weakness or paresthesias.  In ER patient was found to have diverticulitis.  patient is being admitted for further work up and treatment.

## 2023-01-13 NOTE — CONSULT NOTE ADULT - SUBJECTIVE AND OBJECTIVE BOX
INCOMPLETE NOTE.  Documentation in Progress  PT SEEN AND EVALUATED.   FULL/ADDITIONAL RECOMMENDATIONS TO FOLLOW   ***************************************************************    Patient is a 57y old  Female who presents with a chief complaint of diverticulitis (2023 11:04)      HPI:  57-year-old female with history of partial colon resection (sigmoid colon 2022) sent to ED by urgent care for abdominal pain and fever since Tuesday (x2 days).  Pain is right lower quadrant, constant, sharp, nonradiating.  Associated with nausea, no vomiting.  Patient also constipated however passing gas.  Denies chest pain, shortness of breath, urinary symptoms, flank pain, cough, congestion, headache, dizziness, syncope, upper or lower extremity weakness or paresthesias.  In ER patient was found to have diverticulitis.  patient is being admitted for further work up and treatment. (2023 09:02)      PAST MEDICAL & SURGICAL HISTORY:  Colon cancer      HLD (hyperlipidemia)      H/O partial resection of colon         HEALTH ISSUES - PROBLEM Dx:  Diverticulitis of colon    Abdominal pain    Preventive measure          Diverticulitis of large intestine without perforation or abscess without bleeding [K57.32]    Colon cancer [C18.9]    HLD (hyperlipidemia) [E78.5]    H/O partial resection of colon [Z90.49]      FAMILY HISTORY:      [SOCIAL HISTORY: ]     smoking:       EtOH:       illicit drugs:       occupation:       marital status:       Other:       [ALLERGIES/INTOLERANCES:]  Allergies    No Known Allergies    Intolerances        [MEDICATIONS]  MEDICATIONS  (STANDING):  atorvastatin 20 milliGRAM(s) Oral at bedtime  dextrose 5% + sodium chloride 0.9% with potassium chloride 20 mEq/L 1000 milliLiter(s) (100 mL/Hr) IV Continuous <Continuous>  heparin   Injectable 5000 Unit(s) SubCutaneous every 8 hours  lactobacillus acidophilus 1 Tablet(s) Oral daily  multivitamin 1 Tablet(s) Oral daily  piperacillin/tazobactam IVPB.. 3.375 Gram(s) IV Intermittent every 8 hours  potassium chloride    Tablet ER 40 milliEquivalent(s) Oral once    MEDICATIONS  (PRN):  acetaminophen     Tablet .. 650 milliGRAM(s) Oral every 6 hours PRN Temp greater or equal to 38C (100.4F), Mild Pain (1 - 3)      [REVIEW OF SYSTEMS: ]  CONSTITUTIONAL: normal, no fever, no shakes, no chills   EYES: No eye pain, no visual disturbances, no discharge  ENMT:  no discharge  NECK: No pain, no stiffness  BREASTS: No pain, no masses, no nipple discharge  RESPIRATORY: No cough, no wheezing, no chills, no hemoptysis; No shortness of breath  CARDIOVASCULAR: No chest pain, no palpitations, no dizziness, no leg swelling  GASTROINTESTINAL: No abdominal, no epigastric pain. No nausea, no vomiting, no hematemesis; No diarrhea , no constipation. No melena, no hematochezia.  GENITOURINARY: No dysuria, no frequency, no hematuria, no incontinence  NEUROLOGICAL: No headaches, no memory loss, no loss of strength, no numbness, no tremors  SKIN: No itching, no burning, no rashes, no lesions   LYMPH NODES: No enlarged glands  ENDOCRINE: No heat or cold intolerance; No hair loss  MUSCULOSKELETAL: No joint pain or swelling; No muscle, no back, no extremity pain  PSYCHIATRIC: No depression, no anxiety, no mood swings, no difficulty sleeping  HEME/LYMPH: No easy bruising, no bleeding gums    [VITALS SIGNS 24hrs]  Vital Signs Last 24 Hrs  T(C): 36.9 (2023 19:50), Max: 37.7 (2023 11:12)  T(F): 98.4 (2023 19:50), Max: 99.8 (2023 11:12)  HR: 66 (2023 19:50) (66 - 80)  BP: 96/61 (2023 19:50) (96/61 - 115/66)  BP(mean): --  RR: 18 (2023 19:50) (17 - 19)  SpO2: 95% (2023 19:50) (95% - 96%)    Parameters below as of 2023 19:50  Patient On (Oxygen Delivery Method): room air      Daily     Daily     I&O's Summary    2023 07:01  -  2023 07:00  --------------------------------------------------------  IN: 1700 mL / OUT: 0 mL / NET: 1700 mL        [PHYSICAL EXAM]  xxxxxxxxx    [LABS: ]                        11.4   13.45 )-----------( 145      ( 2023 07:30 )             35.4     CBC Full  -  ( 2023 07:30 )  WBC Count : 13.45 K/uL  RBC Count : 3.97 M/uL  Hemoglobin : 11.4 g/dL  Hematocrit : 35.4 %  Platelet Count - Automated : 145 K/uL  Mean Cell Volume : 89.2 fl  Mean Cell Hemoglobin : 28.7 pg  Mean Cell Hemoglobin Concentration : 32.2 gm/dL  Auto Neutrophil # : x  Auto Lymphocyte # : x  Auto Monocyte # : x  Auto Eosinophil # : x  Auto Basophil # : x  Auto Neutrophil % : x  Auto Lymphocyte % : x  Auto Monocyte % : x  Auto Eosinophil % : x  Auto Basophil % : x        144  |  110<H>  |  7   ----------------------------<  112<H>  3.4<L>   |  26  |  0.51    Ca    8.3<L>      2023 09:05  Phos  2.1       Mg     2.1         TPro  7.3  /  Alb  3.8  /  TBili  1.4<H>  /  DBili  x   /  AST  20  /  ALT  21  /  AlkPhos  63  -12      LIVER FUNCTIONS - ( 2023 11:30 )  Alb: 3.8 g/dL / Pro: 7.3 g/dL / ALK PHOS: 63 U/L / ALT: 21 U/L / AST: 20 U/L / GGT: x               Urinalysis Basic - ( 2023 13:20 )    Color: Pale Yellow / Appearance: Clear / S.010 / pH: x  Gluc: x / Ketone: Negative  / Bili: Negative / Urobili: Negative   Blood: x / Protein: Negative / Nitrite: Negative   Leuk Esterase: Negative / RBC: 6-10 /HPF / WBC 0-2   Sq Epi: x / Non Sq Epi: Occasional / Bacteria: Occasional          CBC TREND (5 Days)  WBC Count: 13.45 K/uL ( @ 07:30)  WBC Count: 15.54 K/uL ( 11:30)    Hemoglobin: 11.4 g/dL (:30)  Hemoglobin: 12.6 g/dL ( 11:30)    Hematocrit: 35.4 % (:30)  Hematocrit: 39.9 % ( 11:30)    Platelet Count - Automated: 145 K/uL (:30)  Platelet Count - Automated: 131 K/uL ( 11:30)    Anemia Studies                     Myeloma Studies                     [MICROBIOLOGY /  VIROLOGY:]  COVID-19 PCR: NotDetec (2023 11:30)        Culture - Urine (collected 2023 13:20)  Source: Clean Catch Clean Catch (Midstream)  Final Report (2023 12:41):    <10,000 CFU/mL Normal Urogenital Katie      [PATHOLOGY]     [RADIOLOGY & ADDITIONAL STUDIES:]        Patient is a 57y old  Female who presents with a chief complaint of diverticulitis (2023 11:04)    HPI:  57-year-old female with history of partial colon resection (sigmoid colon 2022) sent to ED by urgent care for abdominal pain and fever since Tuesday (x2 days).  Pain is right lower quadrant, constant, sharp, nonradiating.  Associated with nausea, no vomiting.  Patient also constipated however passing gas.  Denies chest pain, shortness of breath, urinary symptoms, flank pain, cough, congestion, headache, dizziness, syncope, upper or lower extremity weakness or paresthesias.  In ER patient was found to have diverticulitis.  patient is being admitted for further work up and treatment. (2023 09:02)    Onc asked to see pt for hx of sigmoid colon cancer  s/p resection with LAR 2022  Stage I disease.   No adjuvant chemo was recommended    Now presents w abd pain  presents with inability to lucy PO      PAST MEDICAL & SURGICAL HISTORY:  Colon cancer  HLD (hyperlipidemia)  H/O partial resection of colon     HEALTH ISSUES - PROBLEM Dx:  Diverticulitis of colon  Abdominal pain  Preventive measure    Diverticulitis of large intestine without perforation or abscess without bleeding [K57.32]  Colon cancer [C18.9]  HLD (hyperlipidemia) [E78.5]  H/O partial resection of colon [Z90.49]    FAMILY HISTORY:  mother aslive 80+yo  father alive 80+yo, prostate cancer  brother  sister    dtr and son    [SOCIAL HISTORY: ]     smoking:  denies     EtOH:  denies     illicit drugs:  denies     occupation:  homemaker     marital status:       Other: 2 children      [ALLERGIES/INTOLERANCES:]  Allergies       No Known Allergies  Intolerances      [MEDICATIONS]  MEDICATIONS  (STANDING):  atorvastatin 20 milliGRAM(s) Oral at bedtime  dextrose 5% + sodium chloride 0.9% with potassium chloride 20 mEq/L 1000 milliLiter(s) (100 mL/Hr) IV Continuous <Continuous>  heparin   Injectable 5000 Unit(s) SubCutaneous every 8 hours  lactobacillus acidophilus 1 Tablet(s) Oral daily  multivitamin 1 Tablet(s) Oral daily  piperacillin/tazobactam IVPB.. 3.375 Gram(s) IV Intermittent every 8 hours  potassium chloride    Tablet ER 40 milliEquivalent(s) Oral once      MEDICATIONS  (PRN):  acetaminophen     Tablet .. 650 milliGRAM(s) Oral every 6 hours PRN Temp greater or equal to 38C (100.4F), Mild Pain (1 - 3)      [REVIEW OF SYSTEMS: ]  CONSTITUTIONAL: normal, no fever, no shakes, no chills   EYES: No eye pain, no visual disturbances, no discharge  ENMT:  no discharge  NECK: No pain, no stiffness  BREASTS: No pain, no masses, no nipple discharge  RESPIRATORY: No cough, no wheezing, no chills, no hemoptysis; No shortness of breath  CARDIOVASCULAR: No chest pain, no palpitations, no dizziness, no leg swelling  GASTROINTESTINAL: No abdominal, no epigastric pain. No nausea, no vomiting, no hematemesis; No diarrhea , no constipation. No melena, no hematochezia.  GENITOURINARY: No dysuria, no frequency, no hematuria, no incontinence  NEUROLOGICAL: No headaches, no memory loss, no loss of strength, no numbness, no tremors  SKIN: No itching, no burning, no rashes, no lesions   LYMPH NODES: No enlarged glands  ENDOCRINE: No heat or cold intolerance; No hair loss  MUSCULOSKELETAL: No joint pain or swelling; No muscle, no back, no extremity pain  PSYCHIATRIC: No depression, no anxiety, no mood swings, no difficulty sleeping  HEME/LYMPH: No easy bruising, no bleeding gums      [VITALS SIGNS 24hrs]  Vital Signs Last 24 Hrs  T(C): 36.9 (2023 19:50), Max: 37.7 (2023 11:12)  T(F): 98.4 (2023 19:50), Max: 99.8 (2023 11:12)  HR: 66 (2023 19:50) (66 - 80)  BP: 96/61 (2023 19:50) (96/61 - 115/66)  BP(mean): --  RR: 18 (2023 19:50) (17 - 19)  SpO2: 95% (2023 19:50) (95% - 96%)    Parameters below as of 2023 19:50  Patient On (Oxygen Delivery Method): room air      Daily     Daily     I&O's Summary    2023 07:01  -  2023 07:00  --------------------------------------------------------  IN: 1700 mL / OUT: 0 mL / NET: 1700 mL        [PHYSICAL EXAM]  WN WD NAD anicteric  S1 S2 RRR  CTAB  soft NABS NT ND no HSM  mild BLQ tenderness  no LE edema  AOx3    [LABS: ]                        11.4   13.45 )-----------( 145      ( 2023 07:30 )             35.4     CBC Full  -  ( 2023 07:30 )  WBC Count : 13.45 K/uL  RBC Count : 3.97 M/uL  Hemoglobin : 11.4 g/dL  Hematocrit : 35.4 %  Platelet Count - Automated : 145 K/uL  Mean Cell Volume : 89.2 fl  Mean Cell Hemoglobin : 28.7 pg  Mean Cell Hemoglobin Concentration : 32.2 gm/dL  Auto Neutrophil # : x  Auto Lymphocyte # : x  Auto Monocyte # : x  Auto Eosinophil # : x  Auto Basophil # : x  Auto Neutrophil % : x  Auto Lymphocyte % : x  Auto Monocyte % : x  Auto Eosinophil % : x  Auto Basophil % : x        144  |  110<H>  |  7   ----------------------------<  112<H>  3.4<L>   |  26  |  0.51    Ca    8.3<L>      2023 09:05  Phos  2.1     -  Mg     2.1         TPro  7.3  /  Alb  3.8  /  TBili  1.4<H>  /  DBili  x   /  AST  20  /  ALT  21  /  AlkPhos  63  -12      LIVER FUNCTIONS - ( 2023 11:30 )  Alb: 3.8 g/dL / Pro: 7.3 g/dL / ALK PHOS: 63 U/L / ALT: 21 U/L / AST: 20 U/L / GGT: x           Urinalysis Basic - ( 2023 13:20 )  Color: Pale Yellow / Appearance: Clear / S.010 / pH: x  Gluc: x / Ketone: Negative  / Bili: Negative / Urobili: Negative   Blood: x / Protein: Negative / Nitrite: Negative   Leuk Esterase: Negative / RBC: 6-10 /HPF / WBC 0-2   Sq Epi: x / Non Sq Epi: Occasional / Bacteria: Occasional      CBC TREND (5 Days)  WBC Count: 13.45 K/uL ( @ 07:30)  WBC Count: 15.54 K/uL ( 11:30)    Hemoglobin: 11.4 g/dL ( 07:30)  Hemoglobin: 12.6 g/dL ( 11:30)    Hematocrit: 35.4 % (:30)  Hematocrit: 39.9 % (:30)    Platelet Count - Automated: 145 K/uL ( 07:30)  Platelet Count - Automated: 131 K/uL ( 11:30)       [MICROBIOLOGY /  VIROLOGY:]  COVID-19 PCR: NotDetec (2023 11:30)      Culture - Urine (collected 2023 13:20)  Source: Clean Catch Clean Catch (Midstream)  Final Report (2023 12:41):    <10,000 CFU/mL Normal Urogenital Katei      [PATHOLOGY]     [RADIOLOGY & ADDITIONAL STUDIES:]     ACC: [id number] EXAM:  CT ABDOMEN AND PELVIS IC                        PROCEDURE DATE:  2023    INTERPRETATION:  CLINICAL INFORMATION: Right lower quadrant abdominal pain status post partial colonic resection for colon carcinoma  COMPARISON: No prior examinations available for comparison.  CONTRAST/COMPLICATIONS:  ·	IV Contrast: Omnipaque 350  90 cc administered   10 cc discarded  ·	Oral Contrast: NONE  ·	Complications: None reported at time of study completion  PROCEDURE:  ·	CT of the Abdomen and Pelvis was performed.  ·	Sagittal and coronal reformats were performed.  FINDINGS:  ·	LOWER CHEST: No significant pleural or parenchymal abnormality.  ·	LIVER: Within normal limits.  ·	BILE DUCTS: Normal caliber.  ·	GALLBLADDER: Within normal limits.  ·	SPLEEN: Within normal limits.  ·	PANCREAS: Within normal limits.  ·	ADRENALS: Within normal limits.  ·	KIDNEYS/URETERS: No hydronephrosis. No renal calculi. Nonspecific subcentimeter hypodense regions are identified within the renal parenchyma.  ·	BLADDER: Minimally distended.  ·	REPRODUCTIVE ORGANS: Uterus not visualized. Correlate with surgical history.  ·	BOWEL: Surgical anastomosis identified in the region of the distal sigmoid colon. Fecal material scattered throughout the colon. There is segmental wall thickening of the distal ascending colon with stranding of the surrounding paracolic fat and thickening of the adjacent retroperitoneal fascial plane. Findings most suggestive for segmental colonic diverticulitis. Intramural abscess within the medial wall of the distal ascending colon cannot be excluded. Appendix is normal.  ·	PERITONEUM: A small volume of free pelvic fluid identified. No free intraperitoneal air.  ·	VESSELS: Within normal limits.  ·	RETROPERITONEUM/LYMPH NODES: No lymphadenopathy.  ·	ABDOMINAL WALL: Within normal limits.  ·	BONES: Nonspecific region of sclerosis right ischial region. Additional sclerotic foci are identified within the bilateral iliac bones. No acute osseous fracture.  IMPRESSION:  ·	1. There is segmental wall thickening of the distal ascending colon with stranding of the surrounding paracolic fat and thickening of the adjacent retroperitoneal fascial plane. Findings most suggestive for segmental colonic diverticulitis. Intramural abscess within the medial wall of the distal ascending colon cannot be excluded.  ·	2. Nonspecific region of sclerosis right ischial region. Additional sclerotic foci are identified within the bilateral iliac bones. Correlate with prior examinations. Consider bone scintigraphy for further   ·	assessment in light of known history of colon carcinoma.  --- End of Report ---  MARY CASTRO MD; Attending Radiologist  This document has been electronically signed. 2023  2:07PM        ACC: [id number] EXAM:  XR CHEST PORTABLE URGENT 1V                        PROCEDURE DATE:  2023    INTERPRETATION:  AP chest on 2023 at 1:26 PM. Patient has abdominal pain.  COMPARISON: None available.  Heart size normal.  Lungs are clear.  No free intraperitoneal air.  IMPRESSION: Negative chest.  --- End of Report ---  CHASE LIZ MD; Attending Radiologist  This document has been electronically signed. 2023  2:06PM

## 2023-01-14 LAB
ALBUMIN SERPL ELPH-MCNC: 2.7 G/DL — LOW (ref 3.3–5)
ALP SERPL-CCNC: 66 U/L — SIGNIFICANT CHANGE UP (ref 40–120)
ALT FLD-CCNC: 31 U/L — SIGNIFICANT CHANGE UP (ref 12–78)
ANION GAP SERPL CALC-SCNC: 4 MMOL/L — LOW (ref 5–17)
AST SERPL-CCNC: 26 U/L — SIGNIFICANT CHANGE UP (ref 15–37)
BILIRUB SERPL-MCNC: 1 MG/DL — SIGNIFICANT CHANGE UP (ref 0.2–1.2)
BUN SERPL-MCNC: 4 MG/DL — LOW (ref 7–23)
CALCIUM SERPL-MCNC: 8.2 MG/DL — LOW (ref 8.5–10.1)
CHLORIDE SERPL-SCNC: 113 MMOL/L — HIGH (ref 96–108)
CO2 SERPL-SCNC: 28 MMOL/L — SIGNIFICANT CHANGE UP (ref 22–31)
CREAT SERPL-MCNC: 0.53 MG/DL — SIGNIFICANT CHANGE UP (ref 0.5–1.3)
EGFR: 108 ML/MIN/1.73M2 — SIGNIFICANT CHANGE UP
GLUCOSE SERPL-MCNC: 116 MG/DL — HIGH (ref 70–99)
HCT VFR BLD CALC: 33.2 % — LOW (ref 34.5–45)
HGB BLD-MCNC: 10.6 G/DL — LOW (ref 11.5–15.5)
MAGNESIUM SERPL-MCNC: 2 MG/DL — SIGNIFICANT CHANGE UP (ref 1.6–2.6)
MCHC RBC-ENTMCNC: 29 PG — SIGNIFICANT CHANGE UP (ref 27–34)
MCHC RBC-ENTMCNC: 31.9 GM/DL — LOW (ref 32–36)
MCV RBC AUTO: 91 FL — SIGNIFICANT CHANGE UP (ref 80–100)
NRBC # BLD: 0 /100 WBCS — SIGNIFICANT CHANGE UP (ref 0–0)
PHOSPHATE SERPL-MCNC: 2.7 MG/DL — SIGNIFICANT CHANGE UP (ref 2.5–4.5)
PLATELET # BLD AUTO: 151 K/UL — SIGNIFICANT CHANGE UP (ref 150–400)
POTASSIUM SERPL-MCNC: 4.2 MMOL/L — SIGNIFICANT CHANGE UP (ref 3.5–5.3)
POTASSIUM SERPL-SCNC: 4.2 MMOL/L — SIGNIFICANT CHANGE UP (ref 3.5–5.3)
PROT SERPL-MCNC: 6.3 G/DL — SIGNIFICANT CHANGE UP (ref 6–8.3)
RBC # BLD: 3.65 M/UL — LOW (ref 3.8–5.2)
RBC # FLD: 12.5 % — SIGNIFICANT CHANGE UP (ref 10.3–14.5)
SODIUM SERPL-SCNC: 145 MMOL/L — SIGNIFICANT CHANGE UP (ref 135–145)
WBC # BLD: 6.27 K/UL — SIGNIFICANT CHANGE UP (ref 3.8–10.5)
WBC # FLD AUTO: 6.27 K/UL — SIGNIFICANT CHANGE UP (ref 3.8–10.5)

## 2023-01-14 PROCEDURE — 99232 SBSQ HOSP IP/OBS MODERATE 35: CPT

## 2023-01-14 RX ADMIN — DEXTROSE MONOHYDRATE, SODIUM CHLORIDE, AND POTASSIUM CHLORIDE 75 MILLILITER(S): 50; .745; 4.5 INJECTION, SOLUTION INTRAVENOUS at 13:47

## 2023-01-14 RX ADMIN — Medication 1 TABLET(S): at 13:47

## 2023-01-14 RX ADMIN — HEPARIN SODIUM 5000 UNIT(S): 5000 INJECTION INTRAVENOUS; SUBCUTANEOUS at 21:21

## 2023-01-14 RX ADMIN — PIPERACILLIN AND TAZOBACTAM 25 GRAM(S): 4; .5 INJECTION, POWDER, LYOPHILIZED, FOR SOLUTION INTRAVENOUS at 05:47

## 2023-01-14 RX ADMIN — ATORVASTATIN CALCIUM 20 MILLIGRAM(S): 80 TABLET, FILM COATED ORAL at 21:20

## 2023-01-14 RX ADMIN — HEPARIN SODIUM 5000 UNIT(S): 5000 INJECTION INTRAVENOUS; SUBCUTANEOUS at 13:47

## 2023-01-14 RX ADMIN — PIPERACILLIN AND TAZOBACTAM 25 GRAM(S): 4; .5 INJECTION, POWDER, LYOPHILIZED, FOR SOLUTION INTRAVENOUS at 13:47

## 2023-01-14 RX ADMIN — PIPERACILLIN AND TAZOBACTAM 25 GRAM(S): 4; .5 INJECTION, POWDER, LYOPHILIZED, FOR SOLUTION INTRAVENOUS at 21:20

## 2023-01-14 RX ADMIN — Medication 650 MILLIGRAM(S): at 10:53

## 2023-01-14 RX ADMIN — Medication 650 MILLIGRAM(S): at 11:50

## 2023-01-14 RX ADMIN — HEPARIN SODIUM 5000 UNIT(S): 5000 INJECTION INTRAVENOUS; SUBCUTANEOUS at 05:47

## 2023-01-14 NOTE — PROGRESS NOTE ADULT - SUBJECTIVE AND OBJECTIVE BOX
HPI:  57-year-old female with history of partial colon resection (sigmoid colon 7/13/2022) sent to ED by urgent care for abdominal pain and fever since Tuesday (x2 days).  Pain is right lower quadrant, constant, sharp, nonradiating.  Associated with nausea, no vomiting.  Patient also constipated however passing gas.  Denies chest pain, shortness of breath, urinary symptoms, flank pain, cough, congestion, headache, dizziness, syncope, upper or lower extremity weakness or paresthesias.  In ER patient was found to have diverticulitis.  patient is being admitted for further work up and treatment. (13 Jan 2023 09:02)    POD# s/p     SUBJECTIVE:  Patient seen and examined at bedside. No overnight events. Patient reports no new complaints at this time. Admits to flatus and  this AM. Voiding, ambulating and tolerating diet.  Patient denies any fever, chills, chest pain, shortness of breath, nausea, vomiting, or urinary complaints.    VITALS  Vital Signs Last 24 Hrs  T(C): 36.7 (14 Jan 2023 04:47), Max: 37.7 (13 Jan 2023 11:12)  T(F): 98 (14 Jan 2023 04:47), Max: 99.8 (13 Jan 2023 11:12)  HR: 58 (14 Jan 2023 04:47) (58 - 73)  BP: 115/68 (14 Jan 2023 04:47) (96/61 - 115/68)  BP(mean): --  RR: 17 (14 Jan 2023 04:47) (17 - 19)  SpO2: 97% (14 Jan 2023 04:47) (95% - 97%)    Parameters below as of 14 Jan 2023 04:47  Patient On (Oxygen Delivery Method): room air    PHYSICAL EXAM  GENERAL:  Well-nourished, well-developed Female lying comfortably in bed in NAD.  HEENT:  NC/AT. Sclera white. Mucous membranes moist.  CARDIO:  Regular rate and rhythm.  No murmur, gallop or rub appreciated.  RESPIRATORY:  Nonlabored breathing, no accessory muscle use. Lungs clear to auscultation bilaterally.  No wheezing, rales or rhonchi appreciated.  ABDOMEN:  Soft, nondistended, moderate ttp dinh-umbilically. No rebound tenderness or guarding.  EXTREMITIES: No calf tenderness bilaterally.  SKIN:  No jaundice, pallor, or cyanosis  NEURO:  A&O x 3    MEDICATIONS  MEDICATIONS  (STANDING):  atorvastatin 20 milliGRAM(s) Oral at bedtime  dextrose 5% + sodium chloride 0.9% with potassium chloride 20 mEq/L 1000 milliLiter(s) (100 mL/Hr) IV Continuous <Continuous>  heparin   Injectable 5000 Unit(s) SubCutaneous every 8 hours  lactobacillus acidophilus 1 Tablet(s) Oral daily  multivitamin 1 Tablet(s) Oral daily  piperacillin/tazobactam IVPB.. 3.375 Gram(s) IV Intermittent every 8 hours    MEDICATIONS  (PRN):  acetaminophen     Tablet .. 650 milliGRAM(s) Oral every 6 hours PRN Temp greater or equal to 38C (100.4F), Mild Pain (1 - 3)    LABS:                      10.6   6.27  )-----------( 151      ( 14 Jan 2023 06:10 )             33.2     01-14    145  |  113<H>  |  4<L>  ----------------------------<  116<H>  4.2   |  28  |  0.53    Ca    8.2<L>      14 Jan 2023 06:10  Phos  2.7     01-14  Mg     2.0     01-14    TPro  6.3  /  Alb  2.7<L>  /  TBili  1.0  /  DBili  x   /  AST  26  /  ALT  31  /  AlkPhos  66  01-14    Culture - Urine (collected 12 Jan 2023 13:20)  Source: Clean Catch Clean Catch (Midstream)  Final Report (13 Jan 2023 12:41):    <10,000 CFU/mL Normal Urogenital Katie    ASSESSMENT & PLAN  57 year old female w/ PMH colon CA s/p sigmoid resection, admitted with diverticulitis with questionable abscess. VSS, WC resolving.    - NPO/IVF  - IV Zosyn  - Follow up GI & Heme/Onc consults  - ID reccs appreciated  - Pain control, OOB  - F/u AM labs  - To be discussed with Dr. Odom (Covering for Dr. Segura). SUBJECTIVE:  Patient seen and examined at bedside. No overnight events. Patient reports no new complaints at this time. Admits to flatus and BM this AM. Voiding, ambulating and tolerating diet.  Patient denies any fever, chills, chest pain, shortness of breath, nausea, vomiting, or urinary complaints.    VITALS  Vital Signs Last 24 Hrs  T(C): 36.7 (14 Jan 2023 04:47), Max: 37.7 (13 Jan 2023 11:12)  T(F): 98 (14 Jan 2023 04:47), Max: 99.8 (13 Jan 2023 11:12)  HR: 58 (14 Jan 2023 04:47) (58 - 73)  BP: 115/68 (14 Jan 2023 04:47) (96/61 - 115/68)  BP(mean): --  RR: 17 (14 Jan 2023 04:47) (17 - 19)  SpO2: 97% (14 Jan 2023 04:47) (95% - 97%)    Parameters below as of 14 Jan 2023 04:47  Patient On (Oxygen Delivery Method): room air    PHYSICAL EXAM  GENERAL:  Well-nourished, well-developed Female lying comfortably in bed in [facility].  HEENT:  NC/AT. Sclera white. Mucous membranes moist.  CARDIO:  Regular rate and rhythm.  No murmur, gallop or rub appreciated.  RESPIRATORY:  Nonlabored breathing, no accessory muscle use. Lungs clear to auscultation bilaterally.  No wheezing, rales or rhonchi appreciated.  ABDOMEN:  Soft, nondistended, moderate ttp dinh-umbilically. No rebound tenderness or guarding.  EXTREMITIES: No calf tenderness bilaterally.  SKIN:  No jaundice, pallor, or cyanosis  NEURO:  A&O x 3    MEDICATIONS  MEDICATIONS  (STANDING):  atorvastatin 20 milliGRAM(s) Oral at bedtime  dextrose 5% + sodium chloride 0.9% with potassium chloride 20 mEq/L 1000 milliLiter(s) (100 mL/Hr) IV Continuous <Continuous>  heparin   Injectable 5000 Unit(s) SubCutaneous every 8 hours  lactobacillus acidophilus 1 Tablet(s) Oral daily  multivitamin 1 Tablet(s) Oral daily  piperacillin/tazobactam IVPB.. 3.375 Gram(s) IV Intermittent every 8 hours    MEDICATIONS  (PRN):  acetaminophen     Tablet .. 650 milliGRAM(s) Oral every 6 hours PRN Temp greater or equal to 38C (100.4F), Mild Pain (1 - 3)    LABS:                      10.6   6.27  )-----------( 151      ( 14 Jan 2023 06:10 )             33.2     01-14    145  |  113<H>  |  4<L>  ----------------------------<  116<H>  4.2   |  28  |  0.53    Ca    8.2<L>      14 Jan 2023 06:10  Phos  2.7     01-14  Mg     2.0     01-14    TPro  6.3  /  Alb  2.7<L>  /  TBili  1.0  /  DBili  x   /  AST  26  /  ALT  31  /  AlkPhos  66  01-14    Culture - Urine (collected 12 Jan 2023 13:20)  Source: Clean Catch Clean Catch (Midstream)  Final Report (13 Jan 2023 12:41):    <10,000 CFU/mL Normal Urogenital Katie    ASSESSMENT & PLAN  57 year old female w/ PMH colon CA s/p sigmoid resection, admitted with diverticulitis with questionable abscess. VSS, WC resolving.    - NPO/IVF  - IV Zosyn  - Follow up GI & Heme/Onc consults  - ID reccs appreciated  - Pain control, OOB  - F/u AM labs  - To be discussed with Dr. Odom (Covering for Dr. Segura).

## 2023-01-14 NOTE — DIETITIAN INITIAL EVALUATION ADULT - ENERGY INTAKE
Mom calling because patient has an appointment 01/16/18 and called her insurance to see if testing would be covered (wasn't sure if testing would be done as they previously saw Dr. Marsha Ball). The insurance is needing to know what code would be billed in order for them to verify if it would be covered or not. Please call.  Thank you
Spoke with patient's mother and provided her with percutaneous and intracutaneous skin testing codes. Mom verbalized understanding and will contact her insurance company.
Poor (<50%)

## 2023-01-14 NOTE — PROGRESS NOTE ADULT - SUBJECTIVE AND OBJECTIVE BOX
[INTERVAL HX: ]  Patient seen and examined;  Chart reviewed and events noted;   Feeling better   at bedside    drinking ensure  not eating solids      [MEDICATIONS]  MEDICATIONS  (STANDING):  atorvastatin 20 milliGRAM(s) Oral at bedtime  dextrose 5% + sodium chloride 0.9% with potassium chloride 20 mEq/L 1000 milliLiter(s) (75 mL/Hr) IV Continuous <Continuous>  heparin   Injectable 5000 Unit(s) SubCutaneous every 8 hours  lactobacillus acidophilus 1 Tablet(s) Oral daily  multivitamin 1 Tablet(s) Oral daily  piperacillin/tazobactam IVPB.. 3.375 Gram(s) IV Intermittent every 8 hours    MEDICATIONS  (PRN):  acetaminophen     Tablet .. 650 milliGRAM(s) Oral every 6 hours PRN Temp greater or equal to 38C (100.4F), Mild Pain (1 - 3)      [VITALS]  Vital Signs Last 24 Hrs  T(C): 36.7 (2023 11:50), Max: 36.9 (2023 19:50)  T(F): 98 (2023 11:50), Max: 98.4 (2023 19:50)  HR: 64 (2023 11:50) (58 - 66)  BP: 112/73 (2023 11:50) (96/61 - 115/68)  BP(mean): --  RR: 17 (2023 11:50) (17 - 18)  SpO2: 97% (2023 11:50) (95% - 97%)    Parameters below as of 2023 11:50  Patient On (Oxygen Delivery Method): room air      [WT/HT]  Daily     Daily Weight in k.3 (2023 04:47)  [VENT]      [PHYSICAL EXAM]  WNWD NAD anicteric  S1S2 RRR  CTAB  soft NABS NT ND no HSM, mild tenderness to RLQ  no LE edema  anicteric    [LABS:]                        10.6   6.27  )-----------( 151      ( 2023 06:10 )             33.2     -    145  |  113<H>  |  4<L>  ----------------------------<  116<H>  4.2   |  28  |  0.53    Ca    8.2<L>      2023 06:10  Phos  2.7     -  Mg     2.0     -    TPro  6.3  /  Alb  2.7<L>  /  TBili  1.0  /  DBili  x   /  AST  26  /  ALT  31  /  AlkPhos  66  -14        Culture - Urine (collected 2023 13:20)  Source: Clean Catch Clean Catch (Midstream)  Final Report (2023 12:41):    <10,000 CFU/mL Normal Urogenital Katie      COVID-19 PCR: NotDetec (2023 11:30)        Culture - Urine (collected 2023 13:20)  Source: Clean Catch Clean Catch (Midstream)  Final Report (2023 12:41):    <10,000 CFU/mL Normal Urogenital Katie        [RADIOLOGY STUDIES:]     [INTERVAL HX: ]  Patient seen and examined;  Chart reviewed and events noted;     Feeling much better per pt. Abd still feels tight   at bedside    drinking ensure  not eating solids    no BM  +urine      [MEDICATIONS]  MEDICATIONS  (STANDING):  atorvastatin 20 milliGRAM(s) Oral at bedtime  dextrose 5% + sodium chloride 0.9% with potassium chloride 20 mEq/L 1000 milliLiter(s) (75 mL/Hr) IV Continuous <Continuous>  heparin   Injectable 5000 Unit(s) SubCutaneous every 8 hours  lactobacillus acidophilus 1 Tablet(s) Oral daily  multivitamin 1 Tablet(s) Oral daily  piperacillin/tazobactam IVPB.. 3.375 Gram(s) IV Intermittent every 8 hours    MEDICATIONS  (PRN):  acetaminophen     Tablet .. 650 milliGRAM(s) Oral every 6 hours PRN Temp greater or equal to 38C (100.4F), Mild Pain (1 - 3)      [VITALS]  Vital Signs Last 24 Hrs  T(C): 36.7 (2023 11:50), Max: 36.9 (2023 19:50)  T(F): 98 (2023 11:50), Max: 98.4 (2023 19:50)  HR: 64 (2023 11:50) (58 - 66)  BP: 112/73 (2023 11:50) (96/61 - 115/68)  BP(mean): --  RR: 17 (2023 11:50) (17 - 18)  SpO2: 97% (2023 11:50) (95% - 97%)    Parameters below as of 2023 11:50  Patient On (Oxygen Delivery Method): room air      [WT/HT]  Daily     Daily Weight in k.3 (2023 04:47)  [VENT]      [PHYSICAL EXAM]  WNWD NAD anicteric  S1S2 RRR  CTAB  soft NABS NT ND no HSM, mild tenderness to RLQ  no LE edema  anicteric    [LABS:]                        10.6   6.27  )-----------( 151      ( 2023 06:10 )             33.2     01-14    145  |  113<H>  |  4<L>  ----------------------------<  116<H>  4.2   |  28  |  0.53    Ca    8.2<L>      2023 06:10  Phos  2.7     -  Mg     2.0         TPro  6.3  /  Alb  2.7<L>  /  TBili  1.0  /  DBili  x   /  AST  26  /  ALT  31  /  AlkPhos  66  -        Culture - Urine (collected 2023 13:20)  Source: Clean Catch Clean Catch (Midstream)  Final Report (2023 12:41):    <10,000 CFU/mL Normal Urogenital Katie      COVID-19 PCR: NotDetec (2023 11:30)        Culture - Urine (collected 2023 13:20)  Source: Clean Catch Clean Catch (Midstream)  Final Report (2023 12:41):    <10,000 CFU/mL Normal Urogenital Katie        [RADIOLOGY STUDIES:]

## 2023-01-14 NOTE — DIETITIAN INITIAL EVALUATION ADULT - ADD RECOMMEND
1) Continue full liquid diet with IVFs at this time; progress to regular, low-fiber as tolerated  2) Monitor po intake, diet tolerance, weight trends, labs, GI function, skin integrity

## 2023-01-14 NOTE — DIETITIAN INITIAL EVALUATION ADULT - PERTINENT LABORATORY DATA
01-14    145  |  113<H>  |  4<L>  ----------------------------<  116<H>  4.2   |  28  |  0.53    Ca    8.2<L>      14 Jan 2023 06:10  Phos  2.7     01-14  Mg     2.0     01-14    TPro  6.3  /  Alb  2.7<L>  /  TBili  1.0  /  DBili  x   /  AST  26  /  ALT  31  /  AlkPhos  66  01-14  A1C with Estimated Average Glucose Result: 5.7 % (01-12-23 @ 11:30)

## 2023-01-14 NOTE — PROGRESS NOTE ADULT - SUBJECTIVE AND OBJECTIVE BOX
Patient is a 57y old  Female who presents with a chief complaint of diverticulitis (14 Jan 2023 14:58)      INTERVAL /OVERNIGHT EVENTS: feeling better    MEDICATIONS  (STANDING):  atorvastatin 20 milliGRAM(s) Oral at bedtime  dextrose 5% + sodium chloride 0.9% with potassium chloride 20 mEq/L 1000 milliLiter(s) (75 mL/Hr) IV Continuous <Continuous>  heparin   Injectable 5000 Unit(s) SubCutaneous every 8 hours  lactobacillus acidophilus 1 Tablet(s) Oral daily  multivitamin 1 Tablet(s) Oral daily  piperacillin/tazobactam IVPB.. 3.375 Gram(s) IV Intermittent every 8 hours    MEDICATIONS  (PRN):  acetaminophen     Tablet .. 650 milliGRAM(s) Oral every 6 hours PRN Temp greater or equal to 38C (100.4F), Mild Pain (1 - 3)      Allergies    No Known Allergies    Intolerances        REVIEW OF SYSTEMS:  CONSTITUTIONAL: No fever, weight loss, or fatigue  EYES: No eye pain, visual disturbances, or discharge  ENMT:  No difficulty hearing, tinnitus, vertigo; No sinus or throat pain  NECK: No pain or stiffness  RESPIRATORY: No cough, wheezing, chills or hemoptysis; No shortness of breath  CARDIOVASCULAR: No chest pain, palpitations, dizziness, or leg swelling  GASTROINTESTINAL: No abdominal or epigastric pain. No nausea, vomiting, or hematemesis; No diarrhea or constipation. No melena or hematochezia.  GENITOURINARY: No dysuria, frequency, hematuria, or incontinence  NEUROLOGICAL: No headaches, memory loss, loss of strength, numbness, or tremors  SKIN: No itching, burning, rashes, or lesions   LYMPH NODES: No enlarged glands  ENDOCRINE: No heat or cold intolerance; No hair loss; No polydipsia or polyuria  MUSCULOSKELETAL: No joint pain or swelling; No muscle, back, or extremity pain  PSYCHIATRIC: No depression, anxiety, mood swings, or difficulty sleeping  HEME/LYMPH: No easy bruising, or bleeding gums  ALLERGY AND IMMUNOLOGIC: No hives or eczema    Vital Signs Last 24 Hrs  T(C): 36.7 (14 Jan 2023 11:50), Max: 36.9 (13 Jan 2023 19:50)  T(F): 98 (14 Jan 2023 11:50), Max: 98.4 (13 Jan 2023 19:50)  HR: 64 (14 Jan 2023 11:50) (58 - 66)  BP: 112/73 (14 Jan 2023 11:50) (96/61 - 115/68)  BP(mean): --  RR: 17 (14 Jan 2023 11:50) (17 - 18)  SpO2: 97% (14 Jan 2023 11:50) (95% - 97%)    Parameters below as of 14 Jan 2023 11:50  Patient On (Oxygen Delivery Method): room air        PHYSICAL EXAM:  GENERAL: NAD, well-groomed, well-developed  HEAD:  Atraumatic, Normocephalic  EYES: EOMI, PERRLA, conjunctiva and sclera clear  ENMT: No tonsillar erythema, exudates, or enlargement; Moist mucous membranes, Good dentition, No lesions  NECK: Supple, No JVD, Normal thyroid  NERVOUS SYSTEM:  Alert & Oriented X3, Good concentration; Motor Strength 5/5 B/L upper and lower extremities; DTRs 2+ intact and symmetric  CHEST/LUNG: Clear to auscultation bilaterally; No rales, rhonchi, wheezing, or rubs  HEART: Regular rate and rhythm; No murmurs, rubs, or gallops  ABDOMEN: Soft, Nontender, Nondistended; Bowel sounds present  EXTREMITIES:  2+ Peripheral Pulses, No clubbing, cyanosis, or edema  LYMPH: No lymphadenopathy noted  SKIN: No rashes or lesions    LABS:                        10.6   6.27  )-----------( 151      ( 14 Jan 2023 06:10 )             33.2     14 Jan 2023 06:10    145    |  113    |  4      ----------------------------<  116    4.2     |  28     |  0.53     Ca    8.2        14 Jan 2023 06:10  Phos  2.7       14 Jan 2023 06:10  Mg     2.0       14 Jan 2023 06:10    TPro  6.3    /  Alb  2.7    /  TBili  1.0    /  DBili  x      /  AST  26     /  ALT  31     /  AlkPhos  66     14 Jan 2023 06:10        CAPILLARY BLOOD GLUCOSE          RADIOLOGY & ADDITIONAL TESTS:    Notes Reviewed:  [x ] YES  [ ] NO    Care Discussed with Consultants/Other Providers [x ] YES  [ ] NO

## 2023-01-14 NOTE — DIETITIAN INITIAL EVALUATION ADULT - OTHER INFO
57 year old female w/ PMH colon CA s/p sigmoid resection, admitted with diverticulitis with questionable abscess.    Visited pt at bedside this am. Daughter present during visit who interpreted. Pt reports fair appetite/intake at this time. Pt previous NPO. Started on clear liquid diet yesterday. Pt tolerating diet well. Consumed broth this am. Diet now advanced to full liquids. Pt feeds self, minimal assistance needed with tray set-up. No chewing/swallowing difficulties. No food allergies. Denies N/V. No BMs thus far. CBW on admission 113#. Pt reports stable weight, denies significant weight changes. No edema noted. Skin intact. Pt currently on full liquid diet with IVFs. Discussed progression of diet with patient. Pt with no nutritional questions/concerns at this time. RD to provide written diet education once diet advances.  57 year old female w/ PMH colon CA s/p sigmoid resection, admitted with diverticulitis with questionable abscess.    Visited pt at bedside this am. Daughter present during visit who interpreted. Pt reports fair appetite/intake at this time. Pt previous NPO. Started on clear liquid diet yesterday 1/13. Pt tolerating diet well. Consumed broth this am. Diet now advanced to full liquids this afternoon. Pt feeds self, minimal assistance needed with tray set-up. No chewing/swallowing difficulties. No food allergies. Denies N/V. No BMs thus far. CBW on admission 113#. Pt reports stable weight, denies significant weight changes. No edema noted. Skin intact. Pt currently on full liquid diet with IVFs. Discussed progression of diet with patient. Pt with no nutritional questions/concerns at this time. RD to provide written diet education once diet advances.

## 2023-01-14 NOTE — DIETITIAN INITIAL EVALUATION ADULT - PROBLEM SELECTOR PROBLEM 3
Patient safe to DC home per NP. Able to dress self. DC teaching done, instructions reviewed with patient including when and how to follow up with healthcare providers and when to seek emergency care. The patient verbalizes understanding.   Patient ambulator Preventive measure

## 2023-01-14 NOTE — DIETITIAN INITIAL EVALUATION ADULT - PERTINENT MEDS FT
MEDICATIONS  (STANDING):  atorvastatin 20 milliGRAM(s) Oral at bedtime  dextrose 5% + sodium chloride 0.9% with potassium chloride 20 mEq/L 1000 milliLiter(s) (100 mL/Hr) IV Continuous <Continuous>  heparin   Injectable 5000 Unit(s) SubCutaneous every 8 hours  lactobacillus acidophilus 1 Tablet(s) Oral daily  multivitamin 1 Tablet(s) Oral daily  piperacillin/tazobactam IVPB.. 3.375 Gram(s) IV Intermittent every 8 hours    MEDICATIONS  (PRN):  acetaminophen     Tablet .. 650 milliGRAM(s) Oral every 6 hours PRN Temp greater or equal to 38C (100.4F), Mild Pain (1 - 3)

## 2023-01-15 LAB
ALBUMIN SERPL ELPH-MCNC: 3.3 G/DL — SIGNIFICANT CHANGE UP (ref 3.3–5)
ALP SERPL-CCNC: 92 U/L — SIGNIFICANT CHANGE UP (ref 40–120)
ALT FLD-CCNC: 36 U/L — SIGNIFICANT CHANGE UP (ref 12–78)
ANION GAP SERPL CALC-SCNC: 6 MMOL/L — SIGNIFICANT CHANGE UP (ref 5–17)
AST SERPL-CCNC: 23 U/L — SIGNIFICANT CHANGE UP (ref 15–37)
BILIRUB SERPL-MCNC: 0.5 MG/DL — SIGNIFICANT CHANGE UP (ref 0.2–1.2)
BUN SERPL-MCNC: 5 MG/DL — LOW (ref 7–23)
CALCIUM SERPL-MCNC: 9.7 MG/DL — SIGNIFICANT CHANGE UP (ref 8.5–10.1)
CHLORIDE SERPL-SCNC: 106 MMOL/L — SIGNIFICANT CHANGE UP (ref 96–108)
CO2 SERPL-SCNC: 29 MMOL/L — SIGNIFICANT CHANGE UP (ref 22–31)
CREAT SERPL-MCNC: 0.7 MG/DL — SIGNIFICANT CHANGE UP (ref 0.5–1.3)
EGFR: 101 ML/MIN/1.73M2 — SIGNIFICANT CHANGE UP
GLUCOSE SERPL-MCNC: 144 MG/DL — HIGH (ref 70–99)
HCT VFR BLD CALC: 37.3 % — SIGNIFICANT CHANGE UP (ref 34.5–45)
HGB BLD-MCNC: 11.9 G/DL — SIGNIFICANT CHANGE UP (ref 11.5–15.5)
MCHC RBC-ENTMCNC: 28.5 PG — SIGNIFICANT CHANGE UP (ref 27–34)
MCHC RBC-ENTMCNC: 31.9 GM/DL — LOW (ref 32–36)
MCV RBC AUTO: 89.2 FL — SIGNIFICANT CHANGE UP (ref 80–100)
NRBC # BLD: 0 /100 WBCS — SIGNIFICANT CHANGE UP (ref 0–0)
PLATELET # BLD AUTO: 173 K/UL — SIGNIFICANT CHANGE UP (ref 150–400)
POTASSIUM SERPL-MCNC: 4.1 MMOL/L — SIGNIFICANT CHANGE UP (ref 3.5–5.3)
POTASSIUM SERPL-SCNC: 4.1 MMOL/L — SIGNIFICANT CHANGE UP (ref 3.5–5.3)
PROT SERPL-MCNC: 7.8 G/DL — SIGNIFICANT CHANGE UP (ref 6–8.3)
RBC # BLD: 4.18 M/UL — SIGNIFICANT CHANGE UP (ref 3.8–5.2)
RBC # FLD: 11.9 % — SIGNIFICANT CHANGE UP (ref 10.3–14.5)
SODIUM SERPL-SCNC: 141 MMOL/L — SIGNIFICANT CHANGE UP (ref 135–145)
WBC # BLD: 4.99 K/UL — SIGNIFICANT CHANGE UP (ref 3.8–10.5)
WBC # FLD AUTO: 4.99 K/UL — SIGNIFICANT CHANGE UP (ref 3.8–10.5)

## 2023-01-15 PROCEDURE — 99232 SBSQ HOSP IP/OBS MODERATE 35: CPT

## 2023-01-15 RX ADMIN — Medication 650 MILLIGRAM(S): at 05:09

## 2023-01-15 RX ADMIN — HEPARIN SODIUM 5000 UNIT(S): 5000 INJECTION INTRAVENOUS; SUBCUTANEOUS at 13:48

## 2023-01-15 RX ADMIN — Medication 1 TABLET(S): at 13:48

## 2023-01-15 RX ADMIN — Medication 650 MILLIGRAM(S): at 06:14

## 2023-01-15 RX ADMIN — ATORVASTATIN CALCIUM 20 MILLIGRAM(S): 80 TABLET, FILM COATED ORAL at 21:22

## 2023-01-15 RX ADMIN — PIPERACILLIN AND TAZOBACTAM 25 GRAM(S): 4; .5 INJECTION, POWDER, LYOPHILIZED, FOR SOLUTION INTRAVENOUS at 05:06

## 2023-01-15 RX ADMIN — DEXTROSE MONOHYDRATE, SODIUM CHLORIDE, AND POTASSIUM CHLORIDE 50 MILLILITER(S): 50; .745; 4.5 INJECTION, SOLUTION INTRAVENOUS at 11:41

## 2023-01-15 RX ADMIN — HEPARIN SODIUM 5000 UNIT(S): 5000 INJECTION INTRAVENOUS; SUBCUTANEOUS at 05:07

## 2023-01-15 RX ADMIN — PIPERACILLIN AND TAZOBACTAM 25 GRAM(S): 4; .5 INJECTION, POWDER, LYOPHILIZED, FOR SOLUTION INTRAVENOUS at 13:48

## 2023-01-15 RX ADMIN — DEXTROSE MONOHYDRATE, SODIUM CHLORIDE, AND POTASSIUM CHLORIDE 50 MILLILITER(S): 50; .745; 4.5 INJECTION, SOLUTION INTRAVENOUS at 13:48

## 2023-01-15 RX ADMIN — HEPARIN SODIUM 5000 UNIT(S): 5000 INJECTION INTRAVENOUS; SUBCUTANEOUS at 21:22

## 2023-01-15 RX ADMIN — DEXTROSE MONOHYDRATE, SODIUM CHLORIDE, AND POTASSIUM CHLORIDE 75 MILLILITER(S): 50; .745; 4.5 INJECTION, SOLUTION INTRAVENOUS at 02:01

## 2023-01-15 RX ADMIN — PIPERACILLIN AND TAZOBACTAM 25 GRAM(S): 4; .5 INJECTION, POWDER, LYOPHILIZED, FOR SOLUTION INTRAVENOUS at 21:23

## 2023-01-15 NOTE — PROGRESS NOTE ADULT - SUBJECTIVE AND OBJECTIVE BOX
Patient is a 57y old  Female who presents with a chief complaint of diverticulitis (15 Perry 2023 07:13)      INTERVAL /OVERNIGHT EVENTS: pain improved    MEDICATIONS  (STANDING):  atorvastatin 20 milliGRAM(s) Oral at bedtime  dextrose 5% + sodium chloride 0.9% with potassium chloride 20 mEq/L 1000 milliLiter(s) (50 mL/Hr) IV Continuous <Continuous>  heparin   Injectable 5000 Unit(s) SubCutaneous every 8 hours  lactobacillus acidophilus 1 Tablet(s) Oral daily  multivitamin 1 Tablet(s) Oral daily  piperacillin/tazobactam IVPB.. 3.375 Gram(s) IV Intermittent every 8 hours    MEDICATIONS  (PRN):  acetaminophen     Tablet .. 650 milliGRAM(s) Oral every 6 hours PRN Temp greater or equal to 38C (100.4F), Mild Pain (1 - 3)      Allergies    No Known Allergies    Intolerances        REVIEW OF SYSTEMS:  CONSTITUTIONAL: No fever, weight loss, or fatigue  EYES: No eye pain, visual disturbances, or discharge  ENMT:  No difficulty hearing, tinnitus, vertigo; No sinus or throat pain  NECK: No pain or stiffness  RESPIRATORY: No cough, wheezing, chills or hemoptysis; No shortness of breath  CARDIOVASCULAR: No chest pain, palpitations, dizziness, or leg swelling  GASTROINTESTINAL: No abdominal or epigastric pain. No nausea, vomiting, or hematemesis; No diarrhea or constipation. No melena or hematochezia.  GENITOURINARY: No dysuria, frequency, hematuria, or incontinence  NEUROLOGICAL: No headaches, memory loss, loss of strength, numbness, or tremors  SKIN: No itching, burning, rashes, or lesions   LYMPH NODES: No enlarged glands  ENDOCRINE: No heat or cold intolerance; No hair loss; No polydipsia or polyuria  MUSCULOSKELETAL: No joint pain or swelling; No muscle, back, or extremity pain  PSYCHIATRIC: No depression, anxiety, mood swings, or difficulty sleeping  HEME/LYMPH: No easy bruising, or bleeding gums  ALLERGY AND IMMUNOLOGIC: No hives or eczema    Vital Signs Last 24 Hrs  T(C): 36.5 (15 Perry 2023 11:39), Max: 36.6 (14 Jan 2023 20:27)  T(F): 97.7 (15 Perry 2023 11:39), Max: 97.9 (14 Jan 2023 20:27)  HR: 56 (15 Perry 2023 11:39) (56 - 64)  BP: 112/73 (15 Perry 2023 11:39) (112/73 - 129/74)  BP(mean): --  RR: 17 (15 Perry 2023 11:39) (16 - 17)  SpO2: 98% (15 Perry 2023 11:39) (95% - 98%)    Parameters below as of 15 Perry 2023 11:39  Patient On (Oxygen Delivery Method): room air        PHYSICAL EXAM:  GENERAL: NAD, well-groomed, well-developed  HEAD:  Atraumatic, Normocephalic  EYES: EOMI, PERRLA, conjunctiva and sclera clear  ENMT: No tonsillar erythema, exudates, or enlargement; Moist mucous membranes, Good dentition, No lesions  NECK: Supple, No JVD, Normal thyroid  NERVOUS SYSTEM:  Alert & Oriented X3, Good concentration; Motor Strength 5/5 B/L upper and lower extremities; DTRs 2+ intact and symmetric  CHEST/LUNG: Clear to auscultation bilaterally; No rales, rhonchi, wheezing, or rubs  HEART: Regular rate and rhythm; No murmurs, rubs, or gallops  ABDOMEN: Soft, Nontender, Nondistended; Bowel sounds present  EXTREMITIES:  2+ Peripheral Pulses, No clubbing, cyanosis, or edema  LYMPH: No lymphadenopathy noted  SKIN: No rashes or lesions    LABS:                        11.9   4.99  )-----------( 173      ( 15 Perry 2023 12:35 )             37.3     15 Perry 2023 12:35    141    |  106    |  5      ----------------------------<  144    4.1     |  29     |  0.70     Ca    9.7        15 Perry 2023 12:35    TPro  7.8    /  Alb  3.3    /  TBili  0.5    /  DBili  x      /  AST  23     /  ALT  36     /  AlkPhos  92     15 Perry 2023 12:35        CAPILLARY BLOOD GLUCOSE          RADIOLOGY & ADDITIONAL TESTS:    Notes Reviewed:  [x ] YES  [ ] NO    Care Discussed with Consultants/Other Providers [x ] YES  [ ] NO

## 2023-01-15 NOTE — PROGRESS NOTE ADULT - SUBJECTIVE AND OBJECTIVE BOX
SUBJECTIVE:  Patient seen and examined at bedside. No overnight events. Patient does not offer up complaints this AM. Admits to flatus and BM overnight. Voiding and tolerating diet.  Patient denies any fever, chills, chest pain, shortness of breath, nausea, vomiting, or urinary complaints.    VITALS  Vital Signs Last 24 Hrs  T(C): 36.6 (15 Perry 2023 05:08), Max: 36.7 (14 Jan 2023 11:50)  T(F): 97.8 (15 Perry 2023 05:08), Max: 98 (14 Jan 2023 11:50)  HR: 60 (15 Perry 2023 05:08) (60 - 64)  BP: 129/74 (15 Perry 2023 05:08) (112/73 - 129/74)  BP(mean): --  RR: 17 (15 Perry 2023 05:08) (16 - 17)  SpO2: 95% (15 Perry 2023 05:08) (95% - 97%)    Parameters below as of 15 Perry 2023 05:08  Patient On (Oxygen Delivery Method): room air    PHYSICAL EXAM  GENERAL:  Well-nourished, well-developed Female lying comfortably in bed in [facility].  HEENT:  NC/AT. Sclera white. Mucous membranes moist.  CARDIO:  Regular rate and rhythm.  No murmur, gallop or rub appreciated.  RESPIRATORY:  Nonlabored breathing, no accessory muscle use. Lungs clear to auscultation bilaterally.  No wheezing, rales or rhonchi appreciated.  ABDOMEN:  Soft, nondistended, dinh-umbilical, RUQ ttp, though improved from yesterday's exam. No rebound tenderness or guarding.  EXTREMITIES: No calf tenderness bilaterally.  SKIN:  No jaundice, pallor, or cyanosis  NEURO:  A&O x 3    INTAKE & OUTPUT  I&O's Summary    14 Jan 2023 07:01  -  15 Perry 2023 07:00  --------------------------------------------------------  IN: 2095 mL / OUT: 300 mL / NET: 1795 mL    I&O's Detail    14 Jan 2023 07:01  -  15 Perry 2023 07:00  --------------------------------------------------------  IN:    dextrose 5% + sodium chloride 0.9% w/ Additives: 1850 mL    IV PiggyBack: 45 mL    IV PiggyBack: 200 mL  Total IN: 2095 mL    OUT:    Voided (mL): 300 mL  Total OUT: 300 mL    Total NET: 1795 mL    MEDICATIONS  MEDICATIONS  (STANDING):  atorvastatin 20 milliGRAM(s) Oral at bedtime  dextrose 5% + sodium chloride 0.9% with potassium chloride 20 mEq/L 1000 milliLiter(s) (75 mL/Hr) IV Continuous <Continuous>  heparin   Injectable 5000 Unit(s) SubCutaneous every 8 hours  lactobacillus acidophilus 1 Tablet(s) Oral daily  multivitamin 1 Tablet(s) Oral daily  piperacillin/tazobactam IVPB.. 3.375 Gram(s) IV Intermittent every 8 hours    MEDICATIONS  (PRN):  acetaminophen     Tablet .. 650 milliGRAM(s) Oral every 6 hours PRN Temp greater or equal to 38C (100.4F), Mild Pain (1 - 3)    LABS:                      10.6   6.27  )-----------( 151      ( 14 Jan 2023 06:10 )             33.2     01-14    145  |  113<H>  |  4<L>  ----------------------------<  116<H>  4.2   |  28  |  0.53    Ca    8.2<L>      14 Jan 2023 06:10  Phos  2.7     01-14  Mg     2.0     01-14    TPro  6.3  /  Alb  2.7<L>  /  TBili  1.0  /  DBili  x   /  AST  26  /  ALT  31  /  AlkPhos  66  01-14    Culture - Urine (collected 12 Jan 2023 13:20)  Source: Clean Catch Clean Catch (Midstream)  Final Report (13 Jan 2023 12:41):    <10,000 CFU/mL Normal Urogenital Katie    ASSESSMENT & PLAN  57 year old female w/ PMH colon CA s/p sigmoid resection, admitted with diverticulitis with questionable abscess. VSS, WC resolved.    - FLD, adv as tolerated  - IV Zosyn  - Heme/onc reccs appreciated  - ID reccs appreciated  - Pain control, OOB  - F/u AM labs  - To be discussed with Dr. Odom (Covering for Dr. Segura).

## 2023-01-15 NOTE — PROGRESS NOTE ADULT - SUBJECTIVE AND OBJECTIVE BOX
[INTERVAL HX: ]  Patient seen and examined;  Chart reviewed and events noted;     Pt reports feeling better since admission  Atw  Yest BM normal.   had 3 loose BM    noted some pain to BL LQ today.   CBC better    d/w pt and .   spoke w dtr via telephone, who reported pt had bone scan last month, ordered by urologist.   Was told to monitor,     [MEDICATIONS]  MEDICATIONS  (STANDING):  atorvastatin 20 milliGRAM(s) Oral at bedtime  dextrose 5% + sodium chloride 0.9% with potassium chloride 20 mEq/L 1000 milliLiter(s) (50 mL/Hr) IV Continuous <Continuous>  heparin   Injectable 5000 Unit(s) SubCutaneous every 8 hours  lactobacillus acidophilus 1 Tablet(s) Oral daily  multivitamin 1 Tablet(s) Oral daily  piperacillin/tazobactam IVPB.. 3.375 Gram(s) IV Intermittent every 8 hours    MEDICATIONS  (PRN):  acetaminophen     Tablet .. 650 milliGRAM(s) Oral every 6 hours PRN Temp greater or equal to 38C (100.4F), Mild Pain (1 - 3)      [VITALS]  Vital Signs Last 24 Hrs  T(C): 36.5 (15 Perry 2023 11:39), Max: 36.6 (14 Jan 2023 20:27)  T(F): 97.7 (15 Perry 2023 11:39), Max: 97.9 (14 Jan 2023 20:27)  HR: 56 (15 Perry 2023 11:39) (56 - 64)  BP: 112/73 (15 Perry 2023 11:39) (112/73 - 129/74)  BP(mean): --  RR: 17 (15 Perry 2023 11:39) (16 - 17)  SpO2: 98% (15 Perry 2023 11:39) (95% - 98%)    Parameters below as of 15 Perry 2023 11:39  Patient On (Oxygen Delivery Method): room air      [WT/HT]  Daily     Daily   [VENT]      [PHYSICAL EXAM]  WN WD NAD anicteric  S1S2 RRR  CTAB  soft NABS mild tenderness to mod palp in RLW and LLW, ND no HSM  no LE edema  AOx3    [LABS:]                        11.9   4.99  )-----------( 173      ( 15 Perry 2023 12:35 )             37.3     01-15    141  |  106  |  5<L>  ----------------------------<  144<H>  4.1   |  29  |  0.70    Ca    9.7      15 Perry 2023 12:35  Phos  2.7     01-14  Mg     2.0     01-14    TPro  7.8  /  Alb  3.3  /  TBili  0.5  /  DBili  x   /  AST  23  /  ALT  36  /  AlkPhos  92  01-15                COVID-19 PCR: Jonathantesugar (12 Jan 2023 11:30)          [RADIOLOGY STUDIES:]

## 2023-01-16 PROCEDURE — 99233 SBSQ HOSP IP/OBS HIGH 50: CPT

## 2023-01-16 RX ORDER — DIATRIZOATE MEGLUMINE 180 MG/ML
30 INJECTION, SOLUTION INTRAVESICAL ONCE
Refills: 0 | Status: COMPLETED | OUTPATIENT
Start: 2023-01-16 | End: 2023-01-17

## 2023-01-16 RX ADMIN — PIPERACILLIN AND TAZOBACTAM 25 GRAM(S): 4; .5 INJECTION, POWDER, LYOPHILIZED, FOR SOLUTION INTRAVENOUS at 05:27

## 2023-01-16 RX ADMIN — PIPERACILLIN AND TAZOBACTAM 25 GRAM(S): 4; .5 INJECTION, POWDER, LYOPHILIZED, FOR SOLUTION INTRAVENOUS at 21:01

## 2023-01-16 RX ADMIN — Medication 1 TABLET(S): at 13:37

## 2023-01-16 RX ADMIN — HEPARIN SODIUM 5000 UNIT(S): 5000 INJECTION INTRAVENOUS; SUBCUTANEOUS at 13:40

## 2023-01-16 RX ADMIN — DEXTROSE MONOHYDRATE, SODIUM CHLORIDE, AND POTASSIUM CHLORIDE 50 MILLILITER(S): 50; .745; 4.5 INJECTION, SOLUTION INTRAVENOUS at 07:53

## 2023-01-16 RX ADMIN — Medication 1 TABLET(S): at 13:38

## 2023-01-16 RX ADMIN — HEPARIN SODIUM 5000 UNIT(S): 5000 INJECTION INTRAVENOUS; SUBCUTANEOUS at 05:27

## 2023-01-16 RX ADMIN — PIPERACILLIN AND TAZOBACTAM 25 GRAM(S): 4; .5 INJECTION, POWDER, LYOPHILIZED, FOR SOLUTION INTRAVENOUS at 13:38

## 2023-01-16 RX ADMIN — HEPARIN SODIUM 5000 UNIT(S): 5000 INJECTION INTRAVENOUS; SUBCUTANEOUS at 21:04

## 2023-01-16 RX ADMIN — ATORVASTATIN CALCIUM 20 MILLIGRAM(S): 80 TABLET, FILM COATED ORAL at 21:04

## 2023-01-16 NOTE — PROGRESS NOTE ADULT - SUBJECTIVE AND OBJECTIVE BOX
patient seen this AM, having BKfast in bed, in NAD, states she feels better with no current abdominal pain, no overnight events , tolerating full liquid with no adverse GI symptoms, +ve flatus     T(F): 97.7 (01-16-23 @ 04:52), Max: 99 (01-15-23 @ 19:57)  HR: 62 (01-16-23 @ 04:52) (56 - 62)  BP: 117/68 (01-16-23 @ 04:52) (112/73 - 118/70)  RR: 18 (01-16-23 @ 04:52) (17 - 18)  SpO2: 96% (01-16-23 @ 04:52) (96% - 98%)  Wt(kg): --  CAPILLARY BLOOD GLUCOSE          PHYSICAL EXAM:  General: NAD, WDWN.   Neuro:  Alert & oriented x 3  CV: +S1+S2 regular rate and rhythm  Lung: clear to ausculation bilaterally, respirations nonlabored, good inspiratory effort  Abdomen: soft, NT ND, + BS  Extremities: no pedal edema or calf tenderness noted       LABS:                        11.9   4.99  )-----------( 173      ( 15 Perry 2023 12:35 )             37.3     01-15    141  |  106  |  5<L>  ----------------------------<  144<H>  4.1   |  29  |  0.70    Ca    9.7      15 Perry 2023 12:35    TPro  7.8  /  Alb  3.3  /  TBili  0.5  /  DBili  x   /  AST  23  /  ALT  36  /  AlkPhos  92  01-15      I&O's Detail    15 Perry 2023 07:01  -  16 Jan 2023 07:00  --------------------------------------------------------  IN:    dextrose 5% + sodium chloride 0.9% w/ Additives: 1200 mL    IV PiggyBack: 300 mL    IV PiggyBack: 25 mL    Oral Fluid: 1400 mL  Total IN: 2925 mL    OUT:  Total OUT: 0 mL    Total NET: 2925 mL          Impression: 57y Female admitted with Diverticulitis of large intestine without perforation or abscess without bleeding      PMH Colon cancer    HLD (hyperlipidemia)        Plan:  -benign abdominal exam, very comfortable this morning  heme /onc note to follow as out patient  cont current care

## 2023-01-16 NOTE — PROGRESS NOTE ADULT - SUBJECTIVE AND OBJECTIVE BOX
[INTERVAL HX: ]  Patient seen and examined;  Chart reviewed and events noted;   diarrhea better today.   no fever    dtr printed bone scan.   report in chart.     [MEDICATIONS]  MEDICATIONS  (STANDING):  atorvastatin 20 milliGRAM(s) Oral at bedtime  dextrose 5% + sodium chloride 0.9% with potassium chloride 20 mEq/L 1000 milliLiter(s) (50 mL/Hr) IV Continuous <Continuous>  diatrizoate meglumine/diatrizoate sodium. 100 milliLiter(s) Oral once  heparin   Injectable 5000 Unit(s) SubCutaneous every 8 hours  lactobacillus acidophilus 1 Tablet(s) Oral daily  multivitamin 1 Tablet(s) Oral daily  piperacillin/tazobactam IVPB.. 3.375 Gram(s) IV Intermittent every 8 hours    MEDICATIONS  (PRN):  acetaminophen     Tablet .. 650 milliGRAM(s) Oral every 6 hours PRN Temp greater or equal to 38C (100.4F), Mild Pain (1 - 3)      [VITALS]  Vital Signs Last 24 Hrs  T(C): 36.7 (16 Jan 2023 12:09), Max: 37.2 (15 Perry 2023 19:57)  T(F): 98.1 (16 Jan 2023 12:09), Max: 99 (15 Perry 2023 19:57)  HR: 65 (16 Jan 2023 12:09) (58 - 65)  BP: 100/62 (16 Jan 2023 12:09) (100/62 - 118/70)  BP(mean): --  RR: 18 (16 Jan 2023 12:09) (17 - 18)  SpO2: 94% (16 Jan 2023 12:09) (94% - 97%)    Parameters below as of 16 Jan 2023 12:09  Patient On (Oxygen Delivery Method): room air      [WT/HT]  Daily     Daily   [VENT]      [PHYSICAL EXAM]  WN WD NAD  anicteric  S1S2 RRR  CTAB  soft NABS ND no HSM  no LE edema  AOx3    [LABS:]                        11.9   4.99  )-----------( 173      ( 15 Perry 2023 12:35 )             37.3     01-15    141  |  106  |  5<L>  ----------------------------<  144<H>  4.1   |  29  |  0.70    Ca    9.7      15 Perry 2023 12:35    TPro  7.8  /  Alb  3.3  /  TBili  0.5  /  DBili  x   /  AST  23  /  ALT  36  /  AlkPhos  92  01-15                COVID-19 PCR: NotDetec (12 Jan 2023 11:30)          [RADIOLOGY STUDIES:]

## 2023-01-16 NOTE — PROGRESS NOTE ADULT - PROBLEM SELECTOR PLAN 1
IV ABX - zosyn per ID  ID eval with dr. cam patel
- Continue IV Zosyn   - NPO/IVF  - Follow up GI consult  - Recommend hem/onc for work up of right ischial region sclerosis with history of colon carcinoma  - DVT prophylaxis with SQH  - Pain control PRN  - Daily labs; monitor and replete electrolytes PRN  - To be discussed with Dr. Segura
IV ABX - zosyn  ID leanaal with dr. levy
IV ABX - zosyn per ID  ID eval with dr. cam koch

## 2023-01-16 NOTE — PROGRESS NOTE ADULT - SUBJECTIVE AND OBJECTIVE BOX
[facility]  INFECTIOUS DISEASES   [address] [place] [address]  Tel: [phone]     Fax: [phone]  ========================================================  MD Megha Joiner Kaushal, MD Cho, Michelle, MD Sunjit, Jaspal, MD  ========================================================    MRN-[id number]  CEDRICK HORTON       Daughter on the phone interpreting for both of us as per pt wish.    Follow up; Diverticulitis     No fever, doing better, abdominal pain has improved.   Cultures negative. Regular diet with good tolerance. No diarrhea.     PAST MEDICAL & SURGICAL HISTORY:  Colon cancer  HLD (hyperlipidemia)  H/O partial resection of colon    Social Hx: no smoking, ETOH or drugs     FAMILY HISTORY: Father with prostate cancer and mother Leukemia     Allergies  No Known Allergies    Antibiotics:  MEDICATIONS  (STANDING):  atorvastatin 20 milliGRAM(s) Oral at bedtime  dextrose 5% + sodium chloride 0.9%. 1000 milliLiter(s) (125 mL/Hr) IV Continuous <Continuous>  heparin   Injectable 5000 Unit(s) SubCutaneous every 8 hours  lactobacillus acidophilus 1 Tablet(s) Oral daily  multivitamin 1 Tablet(s) Oral daily  piperacillin/tazobactam IVPB.. 3.375 Gram(s) IV Intermittent every 8 hours    REVIEW OF SYSTEMS:  CONSTITUTIONAL:  No Fever or chills  HEENT:  No diplopia or blurred vision.  No sore throat or runny nose.  CARDIOVASCULAR:  No chest pain or SOB.  RESPIRATORY:  No cough, shortness of breath, PND or orthopnea.  GASTROINTESTINAL:  No nausea, vomiting or diarrhea. + abdominal pain   GENITOURINARY:  No dysuria, frequency or urgency. No Blood in urine  MUSCULOSKELETAL:  no joint aches, no muscle pain  SKIN:  No change in skin, hair or nails.  NEUROLOGIC:  No paresthesias or weakness.  PSYCHIATRIC:  No disorder of thought or mood.  ENDOCRINE:  No heat or cold intolerance, polyuria or polydipsia.  HEMATOLOGICAL:  No easy bruising or bleeding.     Physical Exam:  Vital Signs Last 24 Hrs  T(C): 36.5 (16 Jan 2023 04:52), Max: 37.2 (15 Perry 2023 19:57)  T(F): 97.7 (16 Jan 2023 04:52), Max: 99 (15 Perry 2023 19:57)  HR: 62 (16 Jan 2023 04:52) (56 - 62)  BP: 117/68 (16 Jan 2023 04:52) (112/73 - 118/70)  BP(mean): --  RR: 18 (16 Jan 2023 04:52) (17 - 18)  SpO2: 96% (16 Jan 2023 04:52) (96% - 98%)  Parameters below as of 16 Jan 2023 04:52  Patient On (Oxygen Delivery Method): room air  GEN: NAD  HEENT: normocephalic and atraumatic. EOMI. PERRL.    NECK: Supple.  No lymphadenopathy   LUNGS: Clear to auscultation.  HEART: Regular rate and rhythm without murmur.  ABDOMEN: Soft, nondistended.  Positive bowel sounds.   RLQ tenderness better now    : No CVA tenderness  EXTREMITIES: Without edema.  NEUROLOGIC: grossly intact.  PSYCHIATRIC: Appropriate affect .  SKIN: No rash     Labs:                        11.9   4.99  )-----------( 173      ( 15 Perry 2023 12:35 )             37.3     01-15    141  |  106  |  5<L>  ----------------------------<  144<H>  4.1   |  29  |  0.70    Ca    9.7      15 Perry 2023 12:35    TPro  7.8  /  Alb  3.3  /  TBili  0.5  /  DBili  x   /  AST  23  /  ALT  36  /  AlkPhos  92  01-15    Culture - Urine (collected 01-12-23 @ 13:20)  Source: Clean Catch Clean Catch (Midstream)  Final Report (01-13-23 @ 12:41):    <10,000 CFU/mL Normal Urogenital Katie    WBC Count: 4.99 K/uL (01-15-23 @ 12:35)  WBC Count: 6.27 K/uL (01-14-23 @ 06:10)  WBC Count: 13.45 K/uL (01-13-23 @ 07:30)  WBC Count: 15.54 K/uL (01-12-23 @ 11:30)    Creatinine, Serum: 0.70 mg/dL (01-15-23 @ 12:35)  Creatinine, Serum: 0.53 mg/dL (01-14-23 @ 06:10)  Creatinine, Serum: 0.51 mg/dL (01-13-23 @ 09:05)  Creatinine, Serum: 0.71 mg/dL (01-12-23 @ 11:30)     COVID-19 PCR: NotDetec (01-12-23 @ 11:30)    All imaging and other data have been reviewed.  < from: CT Abdomen and Pelvis w/ IV Cont (01.12.23 @ 13:17) >  IMPRESSION:  1. There is segmental wall thickening of the distal ascending colon with   stranding of the surrounding paracolic fat and thickening of the adjacent   retroperitoneal fascial plane. Findings most suggestive for segmental   colonic diverticulitis. Intramural abscess within the medial wall of the   distal ascending colon cannot be excluded.  2. Nonspecific region of sclerosis right ischial region. Additional   sclerotic foci are identified within the bilateral iliac bones. Correlate   with prior examinations. Consider bone scintigraphy for further   assessment in light of known history of colon carcinoma.    Assessment and Plan:   56 yo woman with PMH of sigmoid colon cancer s/p resection in 7/2022 at [facility], was admitted with abdominal pain associated with constipation for 2 days.   CT showed possible distal ascending colon diverticulitis but with the recent surgery and cancer diagnosis, it could be complications and recurrence of malignancy as well.   Due to sclerosis seen in pelvic area needs more work up for possible mets.     Responding to current treatment, no fever, leukocytosis has resolved from 15k to 5k.     Recommendations:  - Blood culture x 2 NGTD    - Continue Zosyn 3.375gm q8   - Surgery and GI consults noted   - Will monitor TMax and WBC   - Tomorrow for repeat CT   - If improving can switch to oral     Will follow.  Discussed with pt's daughter.     Mya Kat MD  Division of Infectious Diseases   Please call ID service at [phone] with any question.      35 minutes spent on total encounter assessing patient, examination, chart review, counseling and coordinating care by the attending physician/nurse/care manager.

## 2023-01-16 NOTE — PROGRESS NOTE ADULT - SUBJECTIVE AND OBJECTIVE BOX
Patient is a 57y old  Female who presents with a chief complaint of diverticulitis (16 Jan 2023 12:27)      INTERVAL /OVERNIGHT EVENTS: feels much better    MEDICATIONS  (STANDING):  atorvastatin 20 milliGRAM(s) Oral at bedtime  diatrizoate meglumine/diatrizoate sodium. 100 milliLiter(s) Oral once  heparin   Injectable 5000 Unit(s) SubCutaneous every 8 hours  lactobacillus acidophilus 1 Tablet(s) Oral daily  multivitamin 1 Tablet(s) Oral daily  piperacillin/tazobactam IVPB.. 3.375 Gram(s) IV Intermittent every 8 hours    MEDICATIONS  (PRN):  acetaminophen     Tablet .. 650 milliGRAM(s) Oral every 6 hours PRN Temp greater or equal to 38C (100.4F), Mild Pain (1 - 3)      Allergies    No Known Allergies    Intolerances        REVIEW OF SYSTEMS:  CONSTITUTIONAL: No fever, weight loss, or fatigue  EYES: No eye pain, visual disturbances, or discharge  ENMT:  No difficulty hearing, tinnitus, vertigo; No sinus or throat pain  NECK: No pain or stiffness  RESPIRATORY: No cough, wheezing, chills or hemoptysis; No shortness of breath  CARDIOVASCULAR: No chest pain, palpitations, dizziness, or leg swelling  GASTROINTESTINAL: No abdominal or epigastric pain. No nausea, vomiting, or hematemesis; No diarrhea or constipation. No melena or hematochezia.  GENITOURINARY: No dysuria, frequency, hematuria, or incontinence  NEUROLOGICAL: No headaches, memory loss, loss of strength, numbness, or tremors  SKIN: No itching, burning, rashes, or lesions   LYMPH NODES: No enlarged glands  ENDOCRINE: No heat or cold intolerance; No hair loss; No polydipsia or polyuria  MUSCULOSKELETAL: No joint pain or swelling; No muscle, back, or extremity pain  PSYCHIATRIC: No depression, anxiety, mood swings, or difficulty sleeping  HEME/LYMPH: No easy bruising, or bleeding gums  ALLERGY AND IMMUNOLOGIC: No hives or eczema    Vital Signs Last 24 Hrs  T(C): 36.7 (16 Jan 2023 12:09), Max: 37.2 (15 Perry 2023 19:57)  T(F): 98.1 (16 Jan 2023 12:09), Max: 99 (15 Perry 2023 19:57)  HR: 65 (16 Jan 2023 12:09) (58 - 65)  BP: 100/62 (16 Jan 2023 12:09) (100/62 - 118/70)  BP(mean): --  RR: 18 (16 Jan 2023 12:09) (17 - 18)  SpO2: 94% (16 Jan 2023 12:09) (94% - 97%)    Parameters below as of 16 Jan 2023 12:09  Patient On (Oxygen Delivery Method): room air        PHYSICAL EXAM:  GENERAL: NAD, well-groomed, well-developed  HEAD:  Atraumatic, Normocephalic  EYES: EOMI, PERRLA, conjunctiva and sclera clear  ENMT: No tonsillar erythema, exudates, or enlargement; Moist mucous membranes, Good dentition, No lesions  NECK: Supple, No JVD, Normal thyroid  NERVOUS SYSTEM:  Alert & Oriented X3, Good concentration; Motor Strength 5/5 B/L upper and lower extremities; DTRs 2+ intact and symmetric  CHEST/LUNG: Clear to auscultation bilaterally; No rales, rhonchi, wheezing, or rubs  HEART: Regular rate and rhythm; No murmurs, rubs, or gallops  ABDOMEN: Soft, Nontender, Nondistended; Bowel sounds present  EXTREMITIES:  2+ Peripheral Pulses, No clubbing, cyanosis, or edema  LYMPH: No lymphadenopathy noted  SKIN: No rashes or lesions    LABS:      Ca    9.7        15 Perry 2023 12:35          CAPILLARY BLOOD GLUCOSE          RADIOLOGY & ADDITIONAL TESTS:    Notes Reviewed:  [x ] YES  [ ] NO    Care Discussed with Consultants/Other Providers [x ] YES  [ ] NO

## 2023-01-17 ENCOUNTER — TRANSCRIPTION ENCOUNTER (OUTPATIENT)
Age: 58
End: 2023-01-17

## 2023-01-17 VITALS
SYSTOLIC BLOOD PRESSURE: 120 MMHG | RESPIRATION RATE: 18 BRPM | OXYGEN SATURATION: 98 % | DIASTOLIC BLOOD PRESSURE: 81 MMHG | HEART RATE: 68 BPM | TEMPERATURE: 98 F

## 2023-01-17 PROCEDURE — 99233 SBSQ HOSP IP/OBS HIGH 50: CPT

## 2023-01-17 PROCEDURE — 99231 SBSQ HOSP IP/OBS SF/LOW 25: CPT

## 2023-01-17 PROCEDURE — 74177 CT ABD & PELVIS W/CONTRAST: CPT | Mod: 26

## 2023-01-17 RX ORDER — METRONIDAZOLE 500 MG
1 TABLET ORAL
Qty: 30 | Refills: 0
Start: 2023-01-17 | End: 2023-01-26

## 2023-01-17 RX ORDER — ACETAMINOPHEN 500 MG
2 TABLET ORAL
Qty: 0 | Refills: 0 | DISCHARGE
Start: 2023-01-17

## 2023-01-17 RX ORDER — CEFPODOXIME PROXETIL 100 MG
1 TABLET ORAL
Qty: 20 | Refills: 0
Start: 2023-01-17 | End: 2023-01-26

## 2023-01-17 RX ORDER — LACTOBACILLUS ACIDOPHILUS 100MM CELL
0 CAPSULE ORAL
Qty: 0 | Refills: 0 | DISCHARGE
Start: 2023-01-17

## 2023-01-17 RX ADMIN — Medication 1 TABLET(S): at 11:16

## 2023-01-17 RX ADMIN — PIPERACILLIN AND TAZOBACTAM 25 GRAM(S): 4; .5 INJECTION, POWDER, LYOPHILIZED, FOR SOLUTION INTRAVENOUS at 05:16

## 2023-01-17 RX ADMIN — DIATRIZOATE MEGLUMINE 30 MILLILITER(S): 180 INJECTION, SOLUTION INTRAVESICAL at 06:54

## 2023-01-17 RX ADMIN — HEPARIN SODIUM 5000 UNIT(S): 5000 INJECTION INTRAVENOUS; SUBCUTANEOUS at 13:54

## 2023-01-17 RX ADMIN — HEPARIN SODIUM 5000 UNIT(S): 5000 INJECTION INTRAVENOUS; SUBCUTANEOUS at 05:17

## 2023-01-17 NOTE — PROGRESS NOTE ADULT - PROVIDER SPECIALTY LIST ADULT
Infectious Disease
Surgery
Family Medicine
Heme/Onc
Heme/Onc
Infectious Disease
Surgery
Heme/Onc
Surgery
Family Medicine
Family Medicine

## 2023-01-17 NOTE — DISCHARGE NOTE PROVIDER - NSDCCPCAREPLAN_GEN_ALL_CORE_FT
PRINCIPAL DISCHARGE DIAGNOSIS  Diagnosis: Diverticulitis, colon  Assessment and Plan of Treatment: follow up with surgeon Dr. CAREY

## 2023-01-17 NOTE — PROGRESS NOTE ADULT - ASSESSMENT
56 yo with diverticulitis  cont abx  f/u cT  likely d/c home on PO abx
[ASSESSMENT and  PLAN]  Hx of sigmoid colon cancer  s/p LAR  Stage I  no adjuvant chemo     admitted with abd pain, leukocytosis, possible diverticulitis.    Incidental noted sclerotic ischial lesions of unclear etiology  Bone mets possible  HOwever reports prior bone scan in Dec 2022  Await report from family      RECOMMENDATIONS    IV abx per infectious diseases / medicine  on Zosyn    Monitor diarrhea    Surgery following    Consideration for MRI or bone scan for bone mets workup.   Can be done inpt or outpt.   Need not hold pt in hospital for workup.   Pt appears reliable for followup.   HOLD on imaging, pending OUTPT REPORTS    Consideration for MRI to further eval abscess / infection.   WBC is however improving and now normal, this would appear less likely.     DVT Prophylaxis  SQ heparin  OOB as lucy    Thank you for consulting us.      D/w Dr Lauren
[ASSESSMENT and  PLAN]  Hx of sigmoid colon cancer  s/p LAR  Stage I  no adjuvant chemo     admitted with abd pain, leukocytosis, possible diverticulitis.    Incidental noted sclerotic ischial lesions of unclear etiology  Bone mets possible  HOwever reports prior bone scan in Dec 2022  Await report from family      RECOMMENDATIONS    IV abx per infectious diseases / medicine  on Zosyn    Monitor diarrhea for recurrence.     Surgery following    Consideration for MRI    Had outpt Bone scan in Dec, negative for for bone mets.   Report in chart. Reviewed     Pt appears reliable for followup.   HOLD on imaging  Consideration for MRI if sx worse,  to further eval abscess / infection.   WBC is however improving and now normal, this would appear less likely.     DVT Prophylaxis  SQ heparin  OOB as lucy      D/w Dr Leonidas ZENDEJAS planning when stable.     Thank you for consulting us.   No additional recommendations at current time.   Will sign off on case for now.   Please call, or re-consult if needed. 
[ASSESSMENT and  PLAN]  Hx of sigmoid colon cancer  s/p LAR  Stage I  no adjuvant chemo     admitted with abd pain, leukocytosis, possible diverticulitis.    Incidental noted sclerotic ischial lesions of unclear etiology  Bone mets possible      RECOMMENDATIONS    IV abx per infectious diseases  on Zosyn    Surgery following    Consideration for MRI or bone scan for bone mets workup.   Can be done inpt or outpt.   Need not hold pt in hospital for workup.   Pt appears reliable for followup;.     Consideration for imaging to further eval abscess / infection.   WBC is however improving    DVT Prophylaxis  SQ heparin  OOB as lucy    Thank you for consulting us.      D/w Dr Lauren
57 year old female with PMH colon CA s/p sigmoid resection, admitted with diverticulitis with questionable abscess.
As in above full PA notation.  Ms. Skinner personally seen and examined during p.m. rounds.  She reports decreasing pain.  Vitals non-suggestive.  Abdomen soft with mild pain to deeper palpation of right side without guarding or rebound.  Labs reassuring with improvement in WBCs.  Clinically, improving overall with right sided diverticulitis.  Surgically, stable for present.  To continue current supportive care with advancement to full liquids.  
As in above full PA note.  Ms. Skinner personally seen and examined during early p.m. rounds.  She reports decreasing pain, but not yet fully resolved.  Vitals non-suggestive.  Abdomen soft with mild pain to deeper palpation of right side without guarding or rebound- stable.  Awaiting today's labs.  Clinically, improving overall with right sided diverticulitis.  Surgically, stable for the present.  To continue current supportive care with full liquids today.  If WBCs normal, can consider low residue diet TOMORROW.  In light of ? intramural abscess, would repeat imaging prior to discharge.

## 2023-01-17 NOTE — DISCHARGE NOTE NURSING/CASE MANAGEMENT/SOCIAL WORK - NSDCPEFALRISK_GEN_ALL_CORE
For information on Fall & Injury Prevention, visit: [email or web address] OR  [email or web address] OR  https://www.cdc.gov/steadi/patient.html

## 2023-01-17 NOTE — DISCHARGE NOTE PROVIDER - CARE PROVIDER_API CALL
Jagdish Segura)  Surgery  [address] [place] [address]  Phone: [phone]  Fax: [phone]  Follow Up Time:     Curtis Lange (DO)  Gastroenterology; Internal Medicine  [address] [place] [address]  Phone: [phone]  Fax: [phone]  Follow Up Time:     Emile Olson  INTERNAL MEDICINE  [address] [place] [address]  Phone: [phone]  Fax: [phone]  Follow Up Time:

## 2023-01-17 NOTE — DISCHARGE NOTE NURSING/CASE MANAGEMENT/SOCIAL WORK - NSDCPNINST_GEN_ALL_CORE
Worsening abdominal pain, fever, nausea and vomiting to call your doctor or go to the nearest emergency room

## 2023-01-17 NOTE — DISCHARGE NOTE NURSING/CASE MANAGEMENT/SOCIAL WORK - PATIENT PORTAL LINK FT
You can access the FollowMyHealth Patient Portal offered by [facility] by registering at the following website: [email or web address]. By joining [employer]’s FollowMyHealth portal, you will also be able to view your health information using other applications (apps) compatible with our system.

## 2023-01-17 NOTE — DISCHARGE NOTE PROVIDER - HOSPITAL COURSE
admitted for diverticulitis  ABX per ID  Diet advance per surgery  Dc after ID and surgical clearance

## 2023-01-17 NOTE — CHART NOTE - NSCHARTNOTEFT_GEN_A_CORE
Do you have Advance Directives (HCP / LV / Organ donation / Documentation of oral advance Directive):   ( x   )  yes    (      )    NO                                                                            Do you have LV - Living will :                                                                                                                                             ( x   )  yes    (      )   No    Do you have HCP - Health Care Proxy:                                                                                                                            (  x   )  yes   (       ) N0    Do you have DNR- Do Not Resuscitate :                                                                                                                           (      )  yes  (     x   )  No    Do you have DNI- Do Not intubate  :                                                                                                                               (      )  yes   (  x     ) No    Do you have MOLST - Medical orders for Life sustaining treatment  :                                                                    (      ) yes    (   x    ) No    Decision Maker :  (   x  ) Patient     (      )  HCA   (     ) Public Health Law Surrogate     (      ) Surrogate  (       ) Guardian    Goals of Care :  (    x  )   Complete Care     (       ) No Limitations                              (       )   Comfort Care       (       )  Hospice                               (      )   Limited medical Intervention / s    Medical Interventions :   (   x     )   CPR       (        )  DNR                                               (     x   )  Intubation with MV - Mechanical Ventilation  (   x   ) BIPAP/CPAP    (         )   DNI                                               (     x    )  Artificial Nutrition -  IVF, TPN / PPN, Tube Feeds             (         )   No Feeding Tube                                                (   x     ) Use Antibiotics                         (          ) No Antibiotics                                                (   x      ) Blood and Blood Products     (         )   No Blood or Blood products                                                (     x     )  Dialysis                                    (         )  No Dialysis                                                (          )  Medical Management only  (         )  No Invasive Interventions or Surgery  Time spent :                        (    x   ) upto 30 minutes                       (           )   more than 30 minutes  ACP reviewed and discussed

## 2023-01-17 NOTE — CARE COORDINATION ASSESSMENT. - OTHER PERTINENT DISCHARGE PLANNING INFORMATION:
Met with patient at bedside and explained the role of CM.  Patient speaks very basic english and declined  phone. She requested that I speak to dtr [phone]. Dtr reports that patient lives in private house with spouse and is independent with all ADL's. No recent homecare services. PCP confirmed in [place]. Discharge planning folder left with patient.  CM will continue to follow.

## 2023-01-17 NOTE — PROGRESS NOTE ADULT - SUBJECTIVE AND OBJECTIVE BOX
pt seen  feeling good  no issues  ICU Vital Signs Last 24 Hrs  T(C): 36.3 (17 Jan 2023 04:48), Max: 36.7 (16 Jan 2023 12:09)  T(F): 97.4 (17 Jan 2023 04:48), Max: 98.1 (16 Jan 2023 12:09)  HR: 60 (17 Jan 2023 06:16) (51 - 65)  BP: 126/70 (17 Jan 2023 04:48) (100/62 - 126/70)  BP(mean): --  ABP: --  ABP(mean): --  RR: 18 (17 Jan 2023 04:48) (17 - 18)  SpO2: 97% (17 Jan 2023 04:48) (94% - 98%)    O2 Parameters below as of 17 Jan 2023 04:48  Patient On (Oxygen Delivery Method): room air        gen-NAD  resp-clear  abd-soft NT/ND                          11.9   4.99  )-----------( 173      ( 15 Perry 2023 12:35 )             37.3   01-15    141  |  106  |  5<L>  ----------------------------<  144<H>  4.1   |  29  |  0.70    Ca    9.7      15 Perry 2023 12:35    TPro  7.8  /  Alb  3.3  /  TBili  0.5  /  DBili  x   /  AST  23  /  ALT  36  /  AlkPhos  92  01-15

## 2023-01-17 NOTE — DISCHARGE NOTE PROVIDER - PROVIDER TOKENS
PROVIDER:[TOKEN:[[id number]]],PROVIDER:[TOKEN:[[id number]]],PROVIDER:[TOKEN:[[id number]]]

## 2023-01-17 NOTE — CARE COORDINATION ASSESSMENT. - NSPASTMEDSURGHISTORY_GEN_ALL_CORE_FT
PAST MEDICAL & SURGICAL HISTORY:  HLD (hyperlipidemia)      Colon cancer      H/O partial resection of colon

## 2023-01-17 NOTE — PROGRESS NOTE ADULT - REASON FOR ADMISSION
diverticulitis

## 2023-01-17 NOTE — CARE COORDINATION ASSESSMENT. - NSCAREPROVIDERS_GEN_ALL_CORE_FT
CARE PROVIDERS:  Accepting Physician: Paulino Lauren  Administration: Chuck Bhakta  Admitting: Paulino Lauren  Attending: Paulino Lauren  Case Management: Arely Del Valle  Consultant: Curtis Lange  Consultant: Herminia Albarran  Consultant: Mya Kat  Consultant: Guru Cates  Consultant: Diana Galvan  Consultant: Jagdish Segura  Consultant: Hon Santos  Consultant: Blanca Boggs  Consultant: Mike Garrido  Covering Team: Sundeep Odom  ED ACP: Marizol Lunsford ED Attending: Eliana Cleveland ED Nurse: Narendra Garcia  Nurse: Mary Jo Rodriguez  Nurse: Taty Kim  Nurse: Jaimie Michelle  Ordered: ADM, User  Outpatient Provider: Emile Olson  Override: Makla Gallegos  PCA/Nursing Assistant: Melisa Camarillo  PCA/Nursing Assistant: Seth Tapia  Primary Team: Christiano Porter  Respiratory Therapy: Chrystal Piña  Team: [facility], Team  UR// Supp. Assoc.: Alvina Young

## 2023-01-17 NOTE — PROGRESS NOTE ADULT - SUBJECTIVE AND OBJECTIVE BOX
[facility]  INFECTIOUS DISEASES   [address] [place] [address]  Tel: [phone]     Fax: [phone]  ========================================================  MD Megha Joiner Kaushal, MD Cho, Michelle, MD Sunjit, Jaspal, MD  ========================================================    [id number]  CEDRICK HORTON     Follow up; Diverticulitis     No fever, doing better, abdominal pain has improved.   Cultures negative. Regular diet with good tolerance. No diarrhea.     PAST MEDICAL & SURGICAL HISTORY:  Colon cancer  HLD (hyperlipidemia)  H/O partial resection of colon    Social Hx: no smoking, ETOH or drugs     FAMILY HISTORY: Father with prostate cancer and mother Leukemia     Allergies  No Known Allergies    Antibiotics:  MEDICATIONS  (STANDING):  atorvastatin 20 milliGRAM(s) Oral at bedtime  dextrose 5% + sodium chloride 0.9%. 1000 milliLiter(s) (125 mL/Hr) IV Continuous <Continuous>  heparin   Injectable 5000 Unit(s) SubCutaneous every 8 hours  lactobacillus acidophilus 1 Tablet(s) Oral daily  multivitamin 1 Tablet(s) Oral daily  piperacillin/tazobactam IVPB.. 3.375 Gram(s) IV Intermittent every 8 hours    REVIEW OF SYSTEMS:  CONSTITUTIONAL:  No Fever or chills  HEENT:  No diplopia or blurred vision.  No sore throat or runny nose.  CARDIOVASCULAR:  No chest pain or SOB.  RESPIRATORY:  No cough, shortness of breath, PND or orthopnea.  GASTROINTESTINAL:  No nausea, vomiting or diarrhea. + abdominal pain   GENITOURINARY:  No dysuria, frequency or urgency. No Blood in urine  MUSCULOSKELETAL:  no joint aches, no muscle pain  SKIN:  No change in skin, hair or nails.  NEUROLOGIC:  No paresthesias or weakness.  PSYCHIATRIC:  No disorder of thought or mood.  ENDOCRINE:  No heat or cold intolerance, polyuria or polydipsia.  HEMATOLOGICAL:  No easy bruising or bleeding.     Physical Exam:  Vital Signs Last 24 Hrs  T(C): 36.3 (17 Jan 2023 04:48), Max: 36.7 (16 Jan 2023 12:09)  T(F): 97.4 (17 Jan 2023 04:48), Max: 98.1 (16 Jan 2023 12:09)  HR: 60 (17 Jan 2023 06:16) (51 - 65)  BP: 126/70 (17 Jan 2023 04:48) (100/62 - 126/70)  RR: 18 (17 Jan 2023 04:48) (17 - 18)  SpO2: 97% (17 Jan 2023 04:48) (94% - 98%)  Parameters below as of 17 Jan 2023 04:48  Patient On (Oxygen Delivery Method): room air  GEN: NAD  HEENT: normocephalic and atraumatic. EOMI. PERRL.    NECK: Supple.  No lymphadenopathy   LUNGS: Clear to auscultation.  HEART: Regular rate and rhythm without murmur.  ABDOMEN: Soft, nondistended.  Positive bowel sounds.   RLQ tenderness better now    : No CVA tenderness  EXTREMITIES: Without edema.  NEUROLOGIC: grossly intact.  PSYCHIATRIC: Appropriate affect .  SKIN: No rash       Labs:                        11.9   4.99  )-----------( 173      ( 15 Perry 2023 12:35 )             37.3     01-15    141  |  106  |  5<L>  ----------------------------<  144<H>  4.1   |  29  |  0.70    Ca    9.7      15 Perry 2023 12:35    TPro  7.8  /  Alb  3.3  /  TBili  0.5  /  DBili  x   /  AST  23  /  ALT  36  /  AlkPhos  92  01-15    Culture - Urine (collected 01-12-23 @ 13:20)  Source: Clean Catch Clean Catch (Midstream)  Final Report (01-13-23 @ 12:41):    <10,000 CFU/mL Normal Urogenital Katie    WBC Count: 4.99 K/uL (01-15-23 @ 12:35)  WBC Count: 6.27 K/uL (01-14-23 @ 06:10)  WBC Count: 13.45 K/uL (01-13-23 @ 07:30)  WBC Count: 15.54 K/uL (01-12-23 @ 11:30)    Creatinine, Serum: 0.70 mg/dL (01-15-23 @ 12:35)  Creatinine, Serum: 0.53 mg/dL (01-14-23 @ 06:10)  Creatinine, Serum: 0.51 mg/dL (01-13-23 @ 09:05)  Creatinine, Serum: 0.71 mg/dL (01-12-23 @ 11:30)    COVID-19 PCR: NotDetec (01-12-23 @ 11:30)    All imaging and other data have been reviewed.  < from: CT Abdomen and Pelvis w/ IV Cont (01.12.23 @ 13:17) >  IMPRESSION:  1. There is segmental wall thickening of the distal ascending colon with   stranding of the surrounding paracolic fat and thickening of the adjacent   retroperitoneal fascial plane. Findings most suggestive for segmental   colonic diverticulitis. Intramural abscess within the medial wall of the   distal ascending colon cannot be excluded.  2. Nonspecific region of sclerosis right ischial region. Additional   sclerotic foci are identified within the bilateral iliac bones. Correlate   with prior examinations. Consider bone scintigraphy for further   assessment in light of known history of colon carcinoma.    Assessment and Plan:   58 yo woman with PMH of sigmoid colon cancer s/p resection in 7/2022 at [facility], was admitted with abdominal pain associated with constipation for 2 days.   CT showed possible distal ascending colon diverticulitis but with the recent surgery and cancer diagnosis, it could be complications and recurrence of malignancy as well.   Due to sclerosis seen in pelvic area needs more work up for possible mets.     Responding to current treatment, no fever, leukocytosis has resolved, went for repeat CT today, result pending.     Recommendations:  - Blood culture x 2 NGTD    - Continue Zosyn 3.375gm q8   - Surgery and GI consults noted   - Will monitor TMax and WBC   - Follow repeat CT from this morning   - If improving can switch to oral     Will follow.    Mya Kat MD  Division of Infectious Diseases   Please call ID service at [phone] with any question.      35 minutes spent on total encounter assessing patient, examination, chart review, counseling and coordinating care by the attending physician/nurse/care manager.       [facility]  INFECTIOUS DISEASES   [address] [place] [address]  Tel: [phone]     Fax: [phone]  ========================================================  MD Megha Joiner Kaushal, MD Cho, Michelle, MD Sunjit, Jaspal, MD  ========================================================    N-[id number]  CEDRICK HORTON     Follow up; Diverticulitis     No fever, doing better, abdominal pain has improved.   Cultures negative. Regular diet with good tolerance. No diarrhea.     PAST MEDICAL & SURGICAL HISTORY:  Colon cancer  HLD (hyperlipidemia)  H/O partial resection of colon    Social Hx: no smoking, ETOH or drugs     FAMILY HISTORY: Father with prostate cancer and mother Leukemia     Allergies  No Known Allergies    Antibiotics:  MEDICATIONS  (STANDING):  atorvastatin 20 milliGRAM(s) Oral at bedtime  dextrose 5% + sodium chloride 0.9%. 1000 milliLiter(s) (125 mL/Hr) IV Continuous <Continuous>  heparin   Injectable 5000 Unit(s) SubCutaneous every 8 hours  lactobacillus acidophilus 1 Tablet(s) Oral daily  multivitamin 1 Tablet(s) Oral daily  piperacillin/tazobactam IVPB.. 3.375 Gram(s) IV Intermittent every 8 hours    REVIEW OF SYSTEMS:  CONSTITUTIONAL:  No Fever or chills  HEENT:  No diplopia or blurred vision.  No sore throat or runny nose.  CARDIOVASCULAR:  No chest pain or SOB.  RESPIRATORY:  No cough, shortness of breath, PND or orthopnea.  GASTROINTESTINAL:  No nausea, vomiting or diarrhea. + abdominal pain   GENITOURINARY:  No dysuria, frequency or urgency. No Blood in urine  MUSCULOSKELETAL:  no joint aches, no muscle pain  SKIN:  No change in skin, hair or nails.  NEUROLOGIC:  No paresthesias or weakness.  PSYCHIATRIC:  No disorder of thought or mood.  ENDOCRINE:  No heat or cold intolerance, polyuria or polydipsia.  HEMATOLOGICAL:  No easy bruising or bleeding.     Physical Exam:  Vital Signs Last 24 Hrs  T(C): 36.3 (17 Jan 2023 04:48), Max: 36.7 (16 Jan 2023 12:09)  T(F): 97.4 (17 Jan 2023 04:48), Max: 98.1 (16 Jan 2023 12:09)  HR: 60 (17 Jan 2023 06:16) (51 - 65)  BP: 126/70 (17 Jan 2023 04:48) (100/62 - 126/70)  RR: 18 (17 Jan 2023 04:48) (17 - 18)  SpO2: 97% (17 Jan 2023 04:48) (94% - 98%)  Parameters below as of 17 Jan 2023 04:48  Patient On (Oxygen Delivery Method): room air  GEN: NAD  HEENT: normocephalic and atraumatic. EOMI. PERRL.    NECK: Supple.  No lymphadenopathy   LUNGS: Clear to auscultation.  HEART: Regular rate and rhythm without murmur.  ABDOMEN: Soft, nondistended.  Positive bowel sounds.   RLQ tenderness better now    : No CVA tenderness  EXTREMITIES: Without edema.  NEUROLOGIC: grossly intact.  PSYCHIATRIC: Appropriate affect .  SKIN: No rash       Labs:                        11.9   4.99  )-----------( 173      ( 15 Perry 2023 12:35 )             37.3     01-15    141  |  106  |  5<L>  ----------------------------<  144<H>  4.1   |  29  |  0.70    Ca    9.7      15 Perry 2023 12:35    TPro  7.8  /  Alb  3.3  /  TBili  0.5  /  DBili  x   /  AST  23  /  ALT  36  /  AlkPhos  92  01-15    Culture - Urine (collected 01-12-23 @ 13:20)  Source: Clean Catch Clean Catch (Midstream)  Final Report (01-13-23 @ 12:41):    <10,000 CFU/mL Normal Urogenital Katie    WBC Count: 4.99 K/uL (01-15-23 @ 12:35)  WBC Count: 6.27 K/uL (01-14-23 @ 06:10)  WBC Count: 13.45 K/uL (01-13-23 @ 07:30)  WBC Count: 15.54 K/uL (01-12-23 @ 11:30)    Creatinine, Serum: 0.70 mg/dL (01-15-23 @ 12:35)  Creatinine, Serum: 0.53 mg/dL (01-14-23 @ 06:10)  Creatinine, Serum: 0.51 mg/dL (01-13-23 @ 09:05)  Creatinine, Serum: 0.71 mg/dL (01-12-23 @ 11:30)    COVID-19 PCR: NotDetec (01-12-23 @ 11:30)    All imaging and other data have been reviewed.  < from: CT Abdomen and Pelvis w/ IV Cont (01.12.23 @ 13:17) >  IMPRESSION:  1. There is segmental wall thickening of the distal ascending colon with   stranding of the surrounding paracolic fat and thickening of the adjacent   retroperitoneal fascial plane. Findings most suggestive for segmental   colonic diverticulitis. Intramural abscess within the medial wall of the   distal ascending colon cannot be excluded.  2. Nonspecific region of sclerosis right ischial region. Additional   sclerotic foci are identified within the bilateral iliac bones. Correlate   with prior examinations. Consider bone scintigraphy for further   assessment in light of known history of colon carcinoma.    Assessment and Plan:   56 yo woman with PMH of sigmoid colon cancer s/p resection in 7/2022 at [facility], was admitted with abdominal pain associated with constipation for 2 days.   CT showed possible distal ascending colon diverticulitis but with the recent surgery and cancer diagnosis, it could be complications and recurrence of malignancy as well.   Due to sclerosis seen in pelvic area needs more work up for possible mets.     Responding to current treatment, no fever, leukocytosis has resolved, went for repeat CT today, result pending.     Recommendations:  - Blood culture x 2 NGTD    - Continue Zosyn 3.375gm q8   - Surgery and GI consults noted   - Will monitor TMax and WBC   - Follow repeat CT from this morning   - If improving can switch to oral     Addendum:  CT result with marked improvement, can switch to oral cefpodoxime and metronidazole to complete total 2 weeks from start.     Mya Kat MD  Division of Infectious Diseases   Please call ID service at [phone] with any question.      35 minutes spent on total encounter assessing patient, examination, chart review, counseling and coordinating care by the attending physician/nurse/care manager.

## 2023-01-17 NOTE — DISCHARGE NOTE PROVIDER - NSDCMRMEDTOKEN_GEN_ALL_CORE_FT
acetaminophen 325 mg oral tablet: 2 tab(s) orally every 6 hours, As needed, Temp greater or equal to 38C (100.4F), Mild Pain (1 - 3)  atorvastatin 20 mg oral tablet: 1 tab(s) orally once a day  lactobacillus acidophilus oral capsule: orally once a day  multivitamin: 1 tab(s) orally once a day   acetaminophen 325 mg oral tablet: 2 tab(s) orally every 6 hours, As needed, Temp greater or equal to 38C (100.4F), Mild Pain (1 - 3)  atorvastatin 20 mg oral tablet: 1 tab(s) orally once a day  cefpodoxime 200 mg oral tablet: 1 tab(s) orally 2 times a day   lactobacillus acidophilus oral capsule: orally once a day  metroNIDAZOLE 500 mg oral tablet: 1 tab(s) orally every 8 hours   multivitamin: 1 tab(s) orally once a day

## 2023-05-30 ENCOUNTER — NON-APPOINTMENT (OUTPATIENT)
Age: 58
End: 2023-05-30

## 2023-05-30 ENCOUNTER — APPOINTMENT (OUTPATIENT)
Dept: ORTHOPEDIC SURGERY | Facility: CLINIC | Age: 58
End: 2023-05-30
Payer: COMMERCIAL

## 2023-05-30 DIAGNOSIS — M17.12 UNILATERAL PRIMARY OSTEOARTHRITIS, LEFT KNEE: ICD-10-CM

## 2023-05-30 PROCEDURE — 99204 OFFICE O/P NEW MOD 45 MIN: CPT

## 2023-05-31 PROBLEM — M17.12 PRIMARY OSTEOARTHRITIS OF LEFT KNEE: Status: ACTIVE | Noted: 2023-05-31

## 2023-05-31 NOTE — HISTORY OF PRESENT ILLNESS
[de-identified] : Patient is here for left knee pain that began 6 weeks ago after a fall. She was seen by her PCP who sent her for xrays. Patient has done nothing at this time to treat their pain. Denies N/T/R/Prior injury. She does not work. She walks for exercise. \par \par The patient's past medical history, past surgical history, medications and allergies were reviewed by me today and documented accordingly. In addition, the patient's family and social history, which were noncontributory to this visit, were reviewed also. Intake form was reviewed. The patient has no family history of arthritis.

## 2023-05-31 NOTE — PHYSICAL EXAM
[de-identified] : Constitutional: Well-nourished, well-developed, No acute distress\par Respiratory:  Good respiratory effort, no SOB\par Lymphatic: No regional lymphadenopathy, no lymphedema\par Psychiatric: Pleasant and normal affect, alert and oriented x3\par Skin: Clean dry and intact B/L LE\par Musculoskeletal: normal except where as noted in regional exam\par \par Left Knee:\par APPEARANCE: no marked deformities, no swelling or malalignment\par POSITIVE TENDERNESS:  + crepitus of the anterior knee, and tenderness of patellar retinaculum\par NONTENDER: jt lines b/l, patellar & quadriceps tendons, MCL/LCL, ITB at the lateral femoral condyle & Gerdy's tubercle, pes bursa. \par ROM: full & painless, although some discomfort in deep knee flexion\par RESISTIVE TESTING: + discomfort with knee ext from deep knee flexion (stretched position), painless knee flexion. \par SPECIAL TESTS: stable v/v stress. painless grind. neg Lachman's. neg ant/post drawer. neg Belkis's. \par \par  [de-identified] : I reviewed, interpreted and clinically correlated the following outside imaging studies,\par [facility] Outside x-rays, 3 views of the left knee, evidence of mild tricompartmental osteoarthritis\par \par ________\par  Date of Exam: 04-\par  \par EXAM:  X-RAY LEFT KNEE 3 VIEWS\par \par HISTORY:  Knee pain. Status post fall.\par \par TECHNIQUE: Weightbearing AP, lateral and sunrise views of the left knee.\par \par COMPARISON: None.\par \par FINDINGS:\par \par Bone density appears normal.\par No visible fracture or posttraumatic deformity. \par The joint spaces are preserved. \par No osteophytes or erosions.\par Small superior patellar enthesophyte. \par No discrete osteolytic or blastic lesion. \par Small suprapatellar effusion is appreciated.\par \par IMPRESSION: \par \par 1.  Small joint effusion. \par 2.  No visible fracture. \par 3.  Small patellar enthesophyte.

## 2023-05-31 NOTE — DISCUSSION/SUMMARY
[de-identified] : Discussed findings of today's exam and possible causes of patient's pain.  Educated patient on their most probable diagnosis of left knee pain status post fall due to underlying mild osteoarthritis and associated chondromalacia, patient has no evidence of acute internal derangement.  Reviewed possible courses of treatment, and we collaboratively decided best course of treatment at this time will include conservative management.  Patient has no effusion, no mechanical symptoms, no evidence of acute internal derangement, no need for MRI as patient has no surgical indications.  Patient advised that she would likely benefit from a course of physical therapy, she states she does not wish to undergo PT, she was given a handout of some home exercises to follow.  Patient advised to  over-the-counter knee compression sleeve with a patella cut out to be worn during the day for support.  Patient started on a course of oral NSAIDs, prescription given for Naprosyn (We discussed all possible side effects of this medication).  Patient does not appear to be in significant pain or dysfunction, recommend deferral of cortisone injection at this time, but may consider if not improving.  I advised the patient and her daughter that this type of injury can take 8-12 weeks to fully resolve.  Follow up as needed.  Patient and adult daughter (acting as Mandarin ) appreciate and agree with current plan.\par \par I work as part of an academic orthopedic group and routinely have a physician in training (resident / fellow) working with me.  Any part of the history and physical exam performed by the physician in training was either directly reviewed and/or replicated by myself.  Any procedure performed by the physician in training was performed under my direct supervision and with the consent of the patient.\par \par This note was generated using dragon medical dictation software.  A reasonable effort has been made for proofreading its contents, but typos may still remain.  If there are any questions or points of clarification needed please notify my office.

## 2023-05-31 NOTE — CONSULT LETTER
[Dear  ___] : Dear  [unfilled], [Consult Letter:] : I had the pleasure of evaluating your patient, [unfilled]. [Please see my note below.] : Please see my note below. [Consult Closing:] : Thank you very much for allowing me to participate in the care of this patient.  If you have any questions, please do not hesitate to contact me. [Sincerely,] : Sincerely, [FreeTextEntry2] : [address] [id number] [FreeTextEntry3] : Gerardo Avitia DO, ATC\par Primary Care Sports Medicine\par [facility]

## 2023-07-14 ENCOUNTER — RX RENEWAL (OUTPATIENT)
Age: 58
End: 2023-07-14

## 2023-07-14 RX ORDER — NAPROXEN 500 MG/1
500 TABLET ORAL
Qty: 60 | Refills: 0 | Status: ACTIVE | COMMUNITY
Start: 2023-05-31 | End: 1900-01-01

## 2023-10-05 ENCOUNTER — APPOINTMENT (OUTPATIENT)
Dept: UROLOGY | Facility: CLINIC | Age: 58
End: 2023-10-05
Payer: COMMERCIAL

## 2023-10-05 VITALS
HEART RATE: 72 BPM | BODY MASS INDEX: 20.02 KG/M2 | SYSTOLIC BLOOD PRESSURE: 135 MMHG | OXYGEN SATURATION: 98 % | HEIGHT: 63 IN | WEIGHT: 113 LBS | DIASTOLIC BLOOD PRESSURE: 78 MMHG

## 2023-10-05 DIAGNOSIS — Z78.9 OTHER SPECIFIED HEALTH STATUS: ICD-10-CM

## 2023-10-05 PROCEDURE — 99204 OFFICE O/P NEW MOD 45 MIN: CPT

## 2023-10-07 LAB
APPEARANCE: CLEAR
BACTERIA UR CULT: NORMAL
BACTERIA: NEGATIVE /HPF
BILIRUBIN URINE: NEGATIVE
BLOOD URINE: NEGATIVE
CAST: 0 /LPF
COLOR: YELLOW
EPITHELIAL CELLS: 0 /HPF
GLUCOSE QUALITATIVE U: NEGATIVE MG/DL
KETONES URINE: NEGATIVE MG/DL
LEUKOCYTE ESTERASE URINE: NEGATIVE
MICROSCOPIC-UA: NORMAL
NITRITE URINE: NEGATIVE
PH URINE: 7
PROTEIN URINE: NEGATIVE MG/DL
RED BLOOD CELLS URINE: 1 /HPF
SPECIFIC GRAVITY URINE: 1.01
URINE CYTOLOGY: NORMAL
UROBILINOGEN URINE: 0.2 MG/DL
WHITE BLOOD CELLS URINE: 0 /HPF

## 2023-10-31 ENCOUNTER — RX CHANGE (OUTPATIENT)
Age: 58
End: 2023-10-31

## 2023-10-31 RX ORDER — OXYBUTYNIN CHLORIDE 5 MG/1
5 TABLET, EXTENDED RELEASE ORAL
Qty: 30 | Refills: 3 | Status: DISCONTINUED | COMMUNITY
Start: 2023-10-05 | End: 2023-10-31

## 2023-10-31 RX ORDER — OXYBUTYNIN CHLORIDE 5 MG/1
5 TABLET, EXTENDED RELEASE ORAL
Qty: 90 | Refills: 1 | Status: ACTIVE | COMMUNITY
Start: 1900-01-01 | End: 1900-01-01

## 2023-11-06 ENCOUNTER — APPOINTMENT (OUTPATIENT)
Dept: UROLOGY | Facility: CLINIC | Age: 58
End: 2023-11-06
Payer: COMMERCIAL

## 2023-11-06 VITALS — OXYGEN SATURATION: 98 % | DIASTOLIC BLOOD PRESSURE: 77 MMHG | HEART RATE: 76 BPM | SYSTOLIC BLOOD PRESSURE: 137 MMHG

## 2023-11-06 DIAGNOSIS — R35.0 FREQUENCY OF MICTURITION: ICD-10-CM

## 2023-11-06 LAB
APPEARANCE: CLEAR
BACTERIA: NEGATIVE /HPF
BILIRUBIN URINE: NEGATIVE
BLOOD URINE: ABNORMAL
CAST: 0 /LPF
COLOR: YELLOW
EPITHELIAL CELLS: 0 /HPF
GLUCOSE QUALITATIVE U: NEGATIVE MG/DL
KETONES URINE: NEGATIVE MG/DL
LEUKOCYTE ESTERASE URINE: NEGATIVE
MICROSCOPIC-UA: NORMAL
NITRITE URINE: NEGATIVE
PH URINE: 6.5
PROTEIN URINE: NEGATIVE MG/DL
RED BLOOD CELLS URINE: 0 /HPF
SPECIFIC GRAVITY URINE: 1
UROBILINOGEN URINE: 0.2 MG/DL
WHITE BLOOD CELLS URINE: 0 /HPF

## 2023-11-06 PROCEDURE — 99214 OFFICE O/P EST MOD 30 MIN: CPT

## 2023-11-07 LAB — URINE CYTOLOGY: NORMAL

## 2023-11-16 ENCOUNTER — APPOINTMENT (OUTPATIENT)
Dept: CT IMAGING | Facility: CLINIC | Age: 58
End: 2023-11-16
Payer: COMMERCIAL

## 2023-11-16 ENCOUNTER — OUTPATIENT (OUTPATIENT)
Dept: OUTPATIENT SERVICES | Facility: HOSPITAL | Age: 58
LOS: 1 days | End: 2023-11-16
Payer: COMMERCIAL

## 2023-11-16 DIAGNOSIS — Z90.49 ACQUIRED ABSENCE OF OTHER SPECIFIED PARTS OF DIGESTIVE TRACT: Chronic | ICD-10-CM

## 2023-11-16 DIAGNOSIS — R31.21 ASYMPTOMATIC MICROSCOPIC HEMATURIA: ICD-10-CM

## 2023-11-16 PROCEDURE — 74176 CT ABD & PELVIS W/O CONTRAST: CPT

## 2023-11-16 PROCEDURE — 74176 CT ABD & PELVIS W/O CONTRAST: CPT | Mod: 26

## 2023-12-04 ENCOUNTER — APPOINTMENT (OUTPATIENT)
Dept: UROLOGY | Facility: CLINIC | Age: 58
End: 2023-12-04
Payer: COMMERCIAL

## 2023-12-04 ENCOUNTER — OUTPATIENT (OUTPATIENT)
Dept: OUTPATIENT SERVICES | Facility: HOSPITAL | Age: 58
LOS: 1 days | End: 2023-12-04
Payer: COMMERCIAL

## 2023-12-04 VITALS
HEART RATE: 87 BPM | DIASTOLIC BLOOD PRESSURE: 82 MMHG | SYSTOLIC BLOOD PRESSURE: 139 MMHG | TEMPERATURE: 98.5 F | OXYGEN SATURATION: 99 %

## 2023-12-04 DIAGNOSIS — Z90.49 ACQUIRED ABSENCE OF OTHER SPECIFIED PARTS OF DIGESTIVE TRACT: Chronic | ICD-10-CM

## 2023-12-04 DIAGNOSIS — R35.0 FREQUENCY OF MICTURITION: ICD-10-CM

## 2023-12-04 PROCEDURE — 99213 OFFICE O/P EST LOW 20 MIN: CPT | Mod: 25

## 2023-12-04 PROCEDURE — 51728 CYSTOMETROGRAM W/VP: CPT | Mod: 26

## 2023-12-04 PROCEDURE — 51797 INTRAABDOMINAL PRESSURE TEST: CPT | Mod: 26

## 2023-12-04 PROCEDURE — 52000 CYSTOURETHROSCOPY: CPT

## 2023-12-04 PROCEDURE — 51741 ELECTRO-UROFLOWMETRY FIRST: CPT

## 2023-12-04 PROCEDURE — 51741 ELECTRO-UROFLOWMETRY FIRST: CPT | Mod: 26

## 2023-12-04 PROCEDURE — 51728 CYSTOMETROGRAM W/VP: CPT

## 2023-12-04 PROCEDURE — 51784 ANAL/URINARY MUSCLE STUDY: CPT

## 2023-12-04 PROCEDURE — 51784 ANAL/URINARY MUSCLE STUDY: CPT | Mod: 26

## 2023-12-04 PROCEDURE — 51797 INTRAABDOMINAL PRESSURE TEST: CPT

## 2024-05-09 RX ORDER — ATORVASTATIN CALCIUM 80 MG/1
1 TABLET, FILM COATED ORAL
Qty: 0 | Refills: 0 | DISCHARGE

## 2024-05-13 ENCOUNTER — APPOINTMENT (OUTPATIENT)
Dept: UROLOGY | Facility: CLINIC | Age: 59
End: 2024-05-13
Payer: COMMERCIAL

## 2024-05-13 VITALS
BODY MASS INDEX: 22.15 KG/M2 | TEMPERATURE: 97 F | HEIGHT: 63 IN | DIASTOLIC BLOOD PRESSURE: 68 MMHG | WEIGHT: 125 LBS | SYSTOLIC BLOOD PRESSURE: 101 MMHG | RESPIRATION RATE: 15 BRPM | OXYGEN SATURATION: 95 % | HEART RATE: 75 BPM

## 2024-05-13 DIAGNOSIS — R19.7 DIARRHEA, UNSPECIFIED: ICD-10-CM

## 2024-05-13 DIAGNOSIS — C18.9 MALIGNANT NEOPLASM OF COLON, UNSPECIFIED: ICD-10-CM

## 2024-05-13 DIAGNOSIS — R31.21 ASYMPTOMATIC MICROSCOPIC HEMATURIA: ICD-10-CM

## 2024-05-13 PROCEDURE — G2211 COMPLEX E/M VISIT ADD ON: CPT

## 2024-05-13 PROCEDURE — 99214 OFFICE O/P EST MOD 30 MIN: CPT

## 2024-05-14 NOTE — HISTORY OF PRESENT ILLNESS
[FreeTextEntry1] : Follow-up hematuria.  Repeat urinalysis.  Follow-up 1 year.  Follow-up urinary frequency.  Patient did not see physiatry.  She states her symptoms have improved.  She declines further evaluation.  Follow-up 1 year.  Please refer to [facility] Consult note

## 2024-05-14 NOTE — ADDENDUM
[FreeTextEntry1] : Entered by Shira Torres, acting as scribe for Dr. Talat Graham. The documentation recorded by the scribe accurately reflects the service I personally performed and the decisions made by me.

## 2024-05-14 NOTE — LETTER BODY
[FreeTextEntry1] : Emile Olson MD [address] [facility] [address], [place] [address] [phone]  Dear Dr. Olson,  Reason for visit: Microscopic hematuria. Urinary frequency.  This is a 59 year-old woman with microscopic hematuria and urinary frequency. Patient previously had cystoscopy and CAT scan with Dr. Souza. Urodynamic testing demonstrated pelvic floor discoordination. Patient is here for follow-up. Since she was last seen, patient reports she did not see physiatry. She states her symptoms have improved. She denies any gross hematuria, dysuria or urinary incontinence. The patient denies any aggravating or relieving factors. The patient denies any interference of function. The patient is entirely asymptomatic. All other review of systems are negative. She has no cancer in her family medical history. She has no previous surgical history. Past medical history, family history and social history were inquired and were noncontributory to current condition. The patient does not use tobacco or drink alcohol. Medications and allergies were reviewed. She has no known allergies to medication.  On examination, the patient is a healthy-appearing woman in no acute distress. She is alert and oriented follows commands. She has normal mood and affect. She is normocephalic. Neck is supple. Oral no thrush. Respirations are unlabored. Abdomen is soft and nontender. Bladder is nonpalpable. No CVA tenderness. Neurologically she is grossly intact. No peripheral edema. Skin without gross abnormality.   Assessment: Microscopic hematuria. Urinary Frequency. Pelvic floor discoordination  I counseled the patient. In terms of her hematuria, she will repeat urinalysis and urine cytology to look for high grade urothelial carcinoma. In terms of her urinary frequency, patient reports she did not see physiatry. Her symptoms improved. She declines physiatry.  The risks and benefits were discussed. I answered the patient questions. The patient will follow up as directed and contact me with any questions or concerns. Thank you for the opportunity to participate in the care of Ms. VALENCIA. I will keep you updated on her progress.  Plan: Follow up in 1 year.  I spent 20-minutes time today on all issues related to this patient on today date of service including non face to face time.

## 2024-05-15 LAB
APPEARANCE: CLEAR
BACTERIA: NEGATIVE /HPF
BILIRUBIN URINE: NEGATIVE
BLOOD URINE: ABNORMAL
CAST: 0 /LPF
COLOR: YELLOW
EPITHELIAL CELLS: 0 /HPF
GLUCOSE QUALITATIVE U: NEGATIVE MG/DL
KETONES URINE: NEGATIVE MG/DL
LEUKOCYTE ESTERASE URINE: NEGATIVE
MICROSCOPIC-UA: NORMAL
NITRITE URINE: NEGATIVE
PH URINE: 6.5
PROTEIN URINE: NEGATIVE MG/DL
RED BLOOD CELLS URINE: 0 /HPF
REVIEW: NORMAL
SPECIFIC GRAVITY URINE: 1.01
UROBILINOGEN URINE: 0.2 MG/DL
WHITE BLOOD CELLS URINE: 0 /HPF

## 2024-05-18 LAB — URINE CYTOLOGY: NORMAL

## 2024-06-01 PROBLEM — E78.5 HYPERLIPIDEMIA, UNSPECIFIED: Chronic | Status: ACTIVE | Noted: 2023-01-12

## 2024-06-01 PROBLEM — C18.9 MALIGNANT NEOPLASM OF COLON, UNSPECIFIED: Chronic | Status: ACTIVE | Noted: 2023-01-12

## 2024-06-08 ENCOUNTER — APPOINTMENT (OUTPATIENT)
Dept: ULTRASOUND IMAGING | Facility: CLINIC | Age: 59
End: 2024-06-08
Payer: COMMERCIAL

## 2024-06-08 ENCOUNTER — OUTPATIENT (OUTPATIENT)
Dept: OUTPATIENT SERVICES | Facility: HOSPITAL | Age: 59
LOS: 1 days | End: 2024-06-08
Payer: COMMERCIAL

## 2024-06-08 DIAGNOSIS — R31.21 ASYMPTOMATIC MICROSCOPIC HEMATURIA: ICD-10-CM

## 2024-06-08 DIAGNOSIS — Z90.49 ACQUIRED ABSENCE OF OTHER SPECIFIED PARTS OF DIGESTIVE TRACT: Chronic | ICD-10-CM

## 2024-06-08 PROCEDURE — 76775 US EXAM ABDO BACK WALL LIM: CPT | Mod: 26

## 2024-06-08 PROCEDURE — 76775 US EXAM ABDO BACK WALL LIM: CPT
